# Patient Record
Sex: FEMALE | Race: WHITE | NOT HISPANIC OR LATINO | Employment: FULL TIME | URBAN - NONMETROPOLITAN AREA
[De-identification: names, ages, dates, MRNs, and addresses within clinical notes are randomized per-mention and may not be internally consistent; named-entity substitution may affect disease eponyms.]

---

## 2017-01-16 ENCOUNTER — OFFICE VISIT (OUTPATIENT)
Dept: OBSTETRICS AND GYNECOLOGY | Facility: CLINIC | Age: 42
End: 2017-01-16

## 2017-01-16 VITALS
RESPIRATION RATE: 16 BRPM | DIASTOLIC BLOOD PRESSURE: 64 MMHG | WEIGHT: 145.2 LBS | HEART RATE: 74 BPM | HEIGHT: 62 IN | SYSTOLIC BLOOD PRESSURE: 110 MMHG | BODY MASS INDEX: 26.72 KG/M2

## 2017-01-16 DIAGNOSIS — Z12.72 VAGINAL PAP SMEAR: ICD-10-CM

## 2017-01-16 DIAGNOSIS — N63.0 BREAST LUMP IN UPPER OUTER QUADRANT: ICD-10-CM

## 2017-01-16 DIAGNOSIS — Z12.31 SCREENING MAMMOGRAM, ENCOUNTER FOR: ICD-10-CM

## 2017-01-16 DIAGNOSIS — Z01.419 ENCOUNTER FOR GYNECOLOGICAL EXAMINATION: Primary | ICD-10-CM

## 2017-01-16 PROCEDURE — 99395 PREV VISIT EST AGE 18-39: CPT | Performed by: NURSE PRACTITIONER

## 2017-01-16 NOTE — PROGRESS NOTES
Subjective   Chief Complaint   Patient presents with   • Gynecologic Exam     well woman annual visit     Rosario Pruett is a 41 y.o. year old  presenting to be seen for her annual exam.      She is sexually active.  In the past 12 months there has not been new sexual partners.  Condoms are not typically used.  She would not like to be screened for STD's at today's exam.     She exercises regularly: yes.  She wears her seat belt:yes.  She has concerns about domestic violence: no.  She is taking Vit D and Calcium:no  Last colonoscopy: Never  Last DEXA: Never  Last MM2015  Last PAP: 6/15/2015  Hx of abnormal pap: yes 13+ years ago, had cryofreeze, no abnormal pap since.        LMP: , had failed endometrial ablation requiring hysterectomy with left SO.    OB History      Para Term  AB TAB SAB Ectopic Multiple Living    1 1        1          Additional Complaints:  Breast lump noted in right breast. Has been presents since  but pt feels it is getting larger. She has 2 maternal aunts and maternal grandmother that had breast cancer. Denies pain or tenderness, changes of skin or nipple discharge.    The following portions of the patient's history were reviewed and updated as appropriate:problem list, current medications, allergies, past family history, past medical history, past social history and past surgical history.    Smoking status: Never Smoker                                                              Smokeless status: Never Used                        Review of Systems   Constitutional: Negative.    Respiratory: Negative.    Cardiovascular: Negative.    Gastrointestinal: Negative.  Negative for abdominal pain, constipation and diarrhea.   Endocrine: Negative.    Genitourinary: Negative.  Negative for pelvic pain.   Skin: Negative.    Neurological: Negative for dizziness, syncope, light-headedness and headaches.   Psychiatric/Behavioral: Negative for suicidal ideas. The  "patient is not nervous/anxious.          Objective   Visit Vitals   • /64 (BP Location: Right arm, Patient Position: Sitting, Cuff Size: Adult)   • Pulse 74   • Resp 16   • Ht 62\" (157.5 cm)   • Wt 145 lb 3.2 oz (65.9 kg)   • LMP  (Approximate)  Comment: 2009   • Breastfeeding No   • BMI 26.56 kg/m2       General:  well developed; well nourished  no acute distress   Skin:  No suspicious lesions seen   Cardiac: Heart sounds are normal.  Regular rate and rhythm without murmur, gallop or rub.   Resp:  Normal expansion.  Clear to auscultation.  No rales, rhonchi, or wheezing.   Thyroid: not examined   Breasts:  Examined in supine position  Nipples normal without inversion, lesions or discharge  There are no palpable axillary nodes  There is an ~  3 cm mass is present in the right breast at 11 o'clock.  It is 4 cm from the outer edge of the areola.  It is freely mobile.   Abdomen: soft, non-tender; no masses  no umbilical or inginual hernias are present  no hepato-splenomegaly   Psych: alert,oriented, in NAD with a full range of affect, normal behavior and no psychotic features   Pelvis: Clinical staff was present for exam  External genitalia:  normal appearance of the external genitalia including Bartholin's and St. Benedict's glands.  :  urethral meatus normal; urethral hypermobility is absent.  Vaginal:  normal pink mucosa without prolapse or lesions. discharge present -  white and small amount, no odor, pt denies concerns;  Cervix:  absent.  Uterus:  absent.  Adnexa:  normal right ovary palpated, left ovary absent     Lab Review   CBC, CMP and TSH    Imaging  Mammogram report       Assessment   1. Encounter for gyn exam  2. Vaginal pap smear  3. Encounter for screening mammogram  4. Breast lump in upper outer quadrant     Plan   1. Pap results: I will send card in mail or call if abnormal. RTC annually for well woman exams  2. Mammogram and US prn scheduled for 1/19. I will call and discuss results with the pt and " consider referral to surgeon prn. Pt verbalizes understanding.      This note was electronically signed.    Mercedes Rogers, APRN  January 16, 2017

## 2017-01-19 ENCOUNTER — HOSPITAL ENCOUNTER (OUTPATIENT)
Dept: OTHER | Facility: HOSPITAL | Age: 42
Discharge: HOME | End: 2017-01-19
Attending: NURSE PRACTITIONER | Admitting: NURSE PRACTITIONER

## 2017-01-19 DIAGNOSIS — N63.10 BREAST MASS, RIGHT: Primary | ICD-10-CM

## 2017-02-01 ENCOUNTER — OFFICE VISIT (OUTPATIENT)
Dept: SURGERY | Facility: CLINIC | Age: 42
End: 2017-02-01

## 2017-02-01 VITALS
BODY MASS INDEX: 26.5 KG/M2 | HEIGHT: 62 IN | SYSTOLIC BLOOD PRESSURE: 120 MMHG | DIASTOLIC BLOOD PRESSURE: 80 MMHG | WEIGHT: 144 LBS

## 2017-02-01 DIAGNOSIS — R92.8 ABNORMAL MAMMOGRAM, UNSPECIFIED: Primary | ICD-10-CM

## 2017-02-01 DIAGNOSIS — R92.8 ABNORMAL MAMMOGRAM OF RIGHT BREAST: Primary | ICD-10-CM

## 2017-02-01 PROCEDURE — 99203 OFFICE O/P NEW LOW 30 MIN: CPT | Performed by: SURGERY

## 2017-02-01 NOTE — PROGRESS NOTES
Chief Complaint   Patient presents with   • Mass     right breast mass.     HPI    Abnormal RIGHT mammogram and US. Has been watched for 1.5 years. BiRADS 4 RIGHT side.    [facility] [place]      Patient Name- MAMADOU ARCHULETA      ORDERING- SENIA LINDSAY      ATTENDING-       REFERRING- SENIA LINDSAY      -----------------------  PROCEDURE- DIAG MAMMO W/CAD BILAT, ULTRASOUND BREAST LIMITED      HISTORY- Enlarging palpable right breast mass. Routine screening  mammography of the left breast.       COMPARISON- Screening mammogram July 2, 2015      NOTE- Computer-aided detection was utilized during this exam.   Digital breast tomosynthesis was performed.      FINDINGS-   CC and MLO views are obtained of both breasts. Spot compression views  were obtained of the right breast.  The breasts are heterogeneously dense, which may obscure small masses.  A triangular skin marker was placed in the location of palpable  concern in the upper right breast. There is a focal asymmetry in the  upper right breast, 7 cm from the nipple, seen best on the MLO view  which persists with spot compression. An additional focal asymmetry in  the inner right breast, 9 cm from the nipple, persists with spot  compression.  No suspicious microcalcifications are seen in either breast.      Targeted ultrasound performed of the palpable area of concern in the  right breast, 12-00 axis, 5 cm from the nipple demonstrates an  irregular shaped, solid, hypoechoic mass with multiple lobulations,  overall measuring approximately 1.8 x 2.3 x 2.2 cm. This correlates  with the mammographic findings.      Additional imaging of the 3-00 axis of the right breast, 8 cm from the  nipple demonstrates a simple cyst which correlates to the other  mammographic findings.      CONCLUSION-   Solid, hypoechoic, multilobulated mass in the palpable area of concern  of the right breast, 12-00 axis, 5 cm from the nipple. Recommend  ultrasound-guided  biopsy.      BI-RADS Category 4- Suspicious abnormality. Biopsy should be  considered.      Findings and recommendations were discussed with the patient at the  time of the exam.  Findings and recommendations discussed with SENIA LINDSAY on  2017 1-15 PM CST      Electronically Signed By- Jose Maria Shelton On: 2017 13:14:45    ( I have personally reviewed the breast imaging and concur with the findings of the radiologist- BiRADS 4)    Past Medical History   Diagnosis Date   • Abnormal Pap smear of cervix    • Abnormal vaginal bleeding    • Acute sinusitis    • Acute upper respiratory infection    • Allergic rhinitis    • Anxiety state    • Breast pain    • Cough    • Depressive disorder    • Female pelvic peritoneal adhesions    • Fever    • Fibrocystic breast    • Foot pain, right    • Fracture of nasal bones, initial encounter for closed fracture    • Generalized anxiety disorder    • GERD (gastroesophageal reflux disease)    • H/O screening mammography    • Injury of bladder      Inadvertant perforation, due to surgery,     • Kidney stone    • Lump of right breast    • Malaise and fatigue    • Menometrorrhagia    • Multiple joint pain    • Other vitamin B12 deficiency anemias    • Pain in joint of right wrist    • Patient currently pregnant      G 1, P 1     • Uterine fibroid    • Visit for gynecologic examination    • Yeast infection      Past Surgical History   Procedure Laterality Date   • Breast biopsy  1999     Right breast mass. Fibroadenoma. Right breast biopsy.   • Injection of medication  2013     B12 (4)      • Bladder surgery  2009     Bladder trauma and perforation. Closure of cystotomy.   •  section     • Cystoplasty  2001     Kidney stone. Cystoscopy, retrogrades, basket extraction of stone, double J-stent placement.(2)   • Injection of medication  2015     Depo Medrol (Methylprednisone) 80mg (3)      • Other surgical history  2008      Hysteroscope procedure (1)    With thermal Choice endometrial ablation.    • Tubal abdominal ligation     • Endometrial ablation     • Hysterectomy       has one ovary     No current outpatient prescriptions on file.  Allergies   Allergen Reactions   • Codeine    • Lortab [Hydrocodone-Acetaminophen]      Family History   Problem Relation Age of Onset   • Depression Other    • Anxiety disorder Other    • Breast cancer Maternal Aunt      Social History     Social History   • Marital status:      Spouse name: N/A   • Number of children: N/A   • Years of education: N/A     Occupational History   • Health aide     Social History Main Topics   • Smoking status: Never Smoker   • Smokeless tobacco: No   • Alcohol use Yes      Comment: occasionally   • Drug use: No   • Sexual activity: Yes     Partners: Male     Birth control/ protection: Surgical       Review of Systems   Constitutional: Positive for appetite change. Negative for chills, fever and unexpected weight change.   HENT: Negative for hearing loss, nosebleeds and trouble swallowing.    Eyes: Negative for visual disturbance.   Respiratory: Negative for apnea, cough, choking, chest tightness, shortness of breath, wheezing and stridor.    Cardiovascular: Negative for chest pain, palpitations and leg swelling.   Gastrointestinal: Negative for abdominal distention, abdominal pain, blood in stool, constipation, diarrhea, nausea and vomiting.   Endocrine: Negative for cold intolerance, heat intolerance, polydipsia, polyphagia and polyuria.   Genitourinary: Negative for difficulty urinating, dysuria, frequency, hematuria and urgency.   Musculoskeletal: Negative for arthralgias, back pain, myalgias and neck pain.   Skin: Negative for color change, pallor and rash.   Allergic/Immunologic: Negative for immunocompromised state.   Neurological: Negative for dizziness, seizures, syncope, light-headedness, numbness and headaches.   Hematological: Negative for adenopathy.    Psychiatric/Behavioral: Negative for suicidal ideas. The patient is not nervous/anxious.      Physical Exam   Constitutional: She is well-developed, well-nourished, and in no distress. No distress.   HENT:   Head: Normocephalic and atraumatic.   Eyes: Conjunctivae and EOM are normal. Pupils are equal, round, and reactive to light.   Neck: Normal range of motion. Neck supple. No JVD present. No tracheal deviation present. No thyromegaly present.   Cardiovascular: Normal rate, regular rhythm and normal heart sounds.    Pulmonary/Chest: Effort normal and breath sounds normal. No respiratory distress. She has no wheezes. She has no rales. She exhibits no tenderness. Right breast exhibits mass and tenderness. Right breast exhibits no inverted nipple, no nipple discharge and no skin change. Left breast exhibits no inverted nipple, no mass, no nipple discharge, no skin change and no tenderness. Breasts are symmetrical.       Lymphadenopathy:     She has no cervical adenopathy.     She has no axillary adenopathy.   Skin: Skin is warm and intact. No lesion and no rash noted. She is not diaphoretic.   Psychiatric: Mood, memory, affect and judgment normal.   Nursing note and vitals reviewed.    ASSESSMENT    Rosario was seen today for mass.    Diagnoses and all orders for this visit:    Abnormal mammogram of right breast    Other orders  -     Place sequential compression device- to be placed on patient in Pre-op; Standing  -     Obtain informed consent; Standing        PLAN  1. US RIGHT mammotome bx with clip placement    The procedure is explained to the patient. The patient will be placed supine for the procedure. A small incision will be made under local anesthesia, and a special, large needle will be used to perform the biopsy under ultrasound guidance.   A permanent titanium clip will be placed in the breast to jean pierre the site of biopsy should further surgery be necessary.  The risks of bleeding/bruising, infection, the  possible need for open biopsy or other procedure, scarring, and poor wound healing are explained. There is a low transfusion risk. The risks of chronic pain are, likewise, low.   Alternatives include open biopsy in the operating room under local anesthesia with moderate sedation or general anesthesia or simply watching the lesion to see if there is change. These are not currently suggested.    She understands and agrees to the procedure.    Signed by Kvng Xavier MD

## 2017-02-06 ENCOUNTER — HOSPITAL ENCOUNTER (OUTPATIENT)
Dept: ULTRASOUND IMAGING | Facility: HOSPITAL | Age: 42
Discharge: HOME | End: 2017-02-06
Admitting: SURGERY

## 2017-02-06 VITALS
BODY MASS INDEX: 26.53 KG/M2 | WEIGHT: 144.18 LBS | SYSTOLIC BLOOD PRESSURE: 116 MMHG | HEIGHT: 62 IN | DIASTOLIC BLOOD PRESSURE: 64 MMHG | HEART RATE: 77 BPM | RESPIRATION RATE: 18 BRPM | TEMPERATURE: 97.1 F | OXYGEN SATURATION: 97 %

## 2017-02-06 DIAGNOSIS — R92.8 ABNORMAL MAMMOGRAM, UNSPECIFIED: ICD-10-CM

## 2017-02-06 PROCEDURE — 19083 BX BREAST 1ST LESION US IMAG: CPT | Performed by: SURGERY

## 2017-02-06 PROCEDURE — A4648 IMPLANTABLE TISSUE MARKER: HCPCS

## 2017-02-06 PROCEDURE — 88305 TISSUE EXAM BY PATHOLOGIST: CPT | Performed by: SURGERY

## 2017-02-06 PROCEDURE — 88305 TISSUE EXAM BY PATHOLOGIST: CPT | Performed by: PATHOLOGY

## 2017-02-06 RX ORDER — DIAZEPAM 5 MG/1
5 TABLET ORAL ONCE
Status: DISCONTINUED | OUTPATIENT
Start: 2017-02-06 | End: 2017-02-06

## 2017-02-06 RX ORDER — LIDOCAINE HYDROCHLORIDE 10 MG/ML
20 INJECTION, SOLUTION INFILTRATION; PERINEURAL ONCE
Status: COMPLETED | OUTPATIENT
Start: 2017-02-06 | End: 2017-02-06

## 2017-02-06 RX ORDER — OXYCODONE HYDROCHLORIDE AND ACETAMINOPHEN 5; 325 MG/1; MG/1
1 TABLET ORAL ONCE
Status: COMPLETED | OUTPATIENT
Start: 2017-02-06 | End: 2017-02-06

## 2017-02-06 RX ORDER — DIAZEPAM 5 MG/1
5 TABLET ORAL ONCE
Status: COMPLETED | OUTPATIENT
Start: 2017-02-06 | End: 2017-02-06

## 2017-02-06 RX ADMIN — OXYCODONE HYDROCHLORIDE AND ACETAMINOPHEN 1 TABLET: 5; 325 TABLET ORAL at 07:30

## 2017-02-06 RX ADMIN — DIAZEPAM 5 MG: 5 TABLET ORAL at 07:30

## 2017-02-06 RX ADMIN — LIDOCAINE HYDROCHLORIDE 20 ML: 10 INJECTION, SOLUTION INFILTRATION; PERINEURAL at 08:13

## 2017-02-06 NOTE — PLAN OF CARE
Problem: Patient Care Overview (Adult)  Goal: Discharge Needs Assessment  Outcome: Outcome(s) achieved Date Met:  02/06/17 02/06/17 0831   Discharge Needs Assessment   Concerns To Be Addressed no discharge needs identified

## 2017-02-06 NOTE — PLAN OF CARE
Problem: Patient Care Overview (Adult)  Goal: Plan of Care Review  Outcome: Outcome(s) achieved Date Met:  02/06/17 02/06/17 0832   Coping/Psychosocial Response Interventions   Plan Of Care Reviewed With patient       Goal: Adult Individualization and Mutuality  Outcome: Outcome(s) achieved Date Met:  02/06/17

## 2017-02-07 LAB
LAB AP CASE REPORT: NORMAL
Lab: NORMAL
PATH REPORT.FINAL DX SPEC: NORMAL
PATH REPORT.GROSS SPEC: NORMAL

## 2017-02-13 ENCOUNTER — OFFICE VISIT (OUTPATIENT)
Dept: SURGERY | Facility: CLINIC | Age: 42
End: 2017-02-13

## 2017-02-13 VITALS
WEIGHT: 144 LBS | DIASTOLIC BLOOD PRESSURE: 60 MMHG | SYSTOLIC BLOOD PRESSURE: 110 MMHG | BODY MASS INDEX: 26.5 KG/M2 | HEIGHT: 62 IN

## 2017-02-13 DIAGNOSIS — N63.10 LUMP OF RIGHT BREAST: Primary | ICD-10-CM

## 2017-02-13 PROCEDURE — 99212 OFFICE O/P EST SF 10 MIN: CPT | Performed by: SURGERY

## 2017-02-13 NOTE — PROGRESS NOTES
Chief Complaint   Patient presents with   • Follow-up     Recheck right breast mammotone 2-6-17.     HPI    Final Diagnosis   RIGHT BREAST, NEEDLE CORE BIOPSY:  FIBROEPITHELIAL LESION CONSISTENT WITH FIBROADENOMA.   Electronically signed by Ray Lacy MD on 2/7/2017 at 1125            Physical Exam   Pulmonary/Chest:         ASSESSMENT    Rosario was seen today for follow-up.    Diagnoses and all orders for this visit:    Lump of right breast  -     US Breast Right Complete; Future         PLAN    1. Recheck in 6 weeks for US of the LEFT breast    Kvng Xavier MD

## 2017-04-03 ENCOUNTER — ANESTHESIA EVENT (OUTPATIENT)
Dept: PERIOP | Facility: HOSPITAL | Age: 42
End: 2017-04-03

## 2017-04-03 ENCOUNTER — APPOINTMENT (OUTPATIENT)
Dept: PREADMISSION TESTING | Facility: HOSPITAL | Age: 42
End: 2017-04-03

## 2017-04-03 ENCOUNTER — OFFICE VISIT (OUTPATIENT)
Dept: SURGERY | Facility: CLINIC | Age: 42
End: 2017-04-03

## 2017-04-03 VITALS
DIASTOLIC BLOOD PRESSURE: 70 MMHG | OXYGEN SATURATION: 99 % | HEART RATE: 74 BPM | SYSTOLIC BLOOD PRESSURE: 112 MMHG | BODY MASS INDEX: 27.05 KG/M2 | WEIGHT: 147 LBS | HEIGHT: 62 IN | RESPIRATION RATE: 18 BRPM

## 2017-04-03 VITALS
BODY MASS INDEX: 27.05 KG/M2 | WEIGHT: 147 LBS | SYSTOLIC BLOOD PRESSURE: 108 MMHG | DIASTOLIC BLOOD PRESSURE: 82 MMHG | HEIGHT: 62 IN

## 2017-04-03 DIAGNOSIS — N63.10 LUMP OF RIGHT BREAST: Primary | ICD-10-CM

## 2017-04-03 PROCEDURE — 99213 OFFICE O/P EST LOW 20 MIN: CPT | Performed by: SURGERY

## 2017-04-03 RX ORDER — SODIUM CHLORIDE, SODIUM LACTATE, POTASSIUM CHLORIDE, CALCIUM CHLORIDE 600; 310; 30; 20 MG/100ML; MG/100ML; MG/100ML; MG/100ML
100 INJECTION, SOLUTION INTRAVENOUS CONTINUOUS
Status: CANCELLED | OUTPATIENT
Start: 2017-04-03

## 2017-04-03 RX ORDER — SODIUM CHLORIDE 0.9 % (FLUSH) 0.9 %
1-10 SYRINGE (ML) INJECTION AS NEEDED
Status: CANCELLED | OUTPATIENT
Start: 2017-04-03

## 2017-04-03 NOTE — PROGRESS NOTES
Chief Complaint   Patient presents with   • Follow-up     recheck right breast.        HPI    Mammotome for a RIGHT breast mass 6 weeks ago. Pathology showed a fibroadenoma. Follow up US shows persistence. Desires excision.    [facility] [place]     Patient Name: MAMADOU ARCHULETA     ORDERING: SARATH XAVIER     ATTENDING:     REFERRING: SARATH XAVIER     -----------------------     PROCEDURE: US BREAST UNILATERAL LIMITED     HISTORY: Recent right breast biopsy.     COMPARISON: Previous right breast ultrasound dated 1/19/2017.     NOTE:   Computer-aided detection was utilized during this exam.   Digital breast tomosynthesis was performed.     Pathology report states fibroepithelial lesion consistent with  fibroadenoma.     FINDINGS: Sonographic imaging of the right breast in the 12:00  axis, 6 cm from the nipple demonstrates a heterogeneous,  hypoechoic mass with irregular margins and posterior acoustic  shadowing. A biopsy clip is noted in the central portion of part  of the lesion. The prebiopsy images are not concordant with a  fibroadenoma. Recommend excisional biopsy.           IMPRESSION:  CONCLUSION:   1. Sonographic imaging of the right breast in the 12:00 axis, 6  cm from the nipple demonstrates a heterogeneous, hypoechoic mass  with irregular margins and posterior acoustic shadowing. A biopsy  clip is noted in portion of the lesion. The prebiopsy images are  not concordant with a fibroadenoma. Recommend excisional biopsy.     2. BI-RADS Category 4: Suspicious abnormality. Biopsy should be  considered.     Findings and recommendations discussed with the patient at the  time of the exam, and with Dr. Xavier on 3/28/2017 3:35 PM CDT.     Electronically signed by: Jose Maria Shelton MD 3/28/2017 3:36 PM CDT  Workstation: [id number]     ( I have personally reviewed the breast imaging and concur with the findings of the radiologist- BiRADS 4 side- likely a persistent fibroadenoma)    Past Medical History:    Diagnosis Date   • Abnormal Pap smear of cervix    • Abnormal vaginal bleeding    • Acute sinusitis    • Acute upper respiratory infection    • Allergic rhinitis    • Anxiety state    • Breast pain    • Cough    • Depressive disorder    • Female pelvic peritoneal adhesions    • Fever    • Fibrocystic breast    • Foot pain, right    • Fracture of nasal bones, initial encounter for closed fracture    • Generalized anxiety disorder    • GERD (gastroesophageal reflux disease)    • H/O screening mammography    • Injury of bladder     Inadvertant perforation, due to surgery,     • Kidney stone    • Lump of right breast    • Malaise and fatigue    • Menometrorrhagia    • Multiple joint pain    • Other vitamin B12 deficiency anemias    • Pain in joint of right wrist    • Patient currently pregnant     G 1, P 1     • Uterine fibroid    • Visit for gynecologic examination    • Yeast infection        Past Surgical History:   Procedure Laterality Date   • BLADDER SURGERY  2009    Bladder trauma and perforation. Closure of cystotomy.   • BREAST BIOPSY  1999    Right breast mass. Fibroadenoma. Right breast biopsy.   •  SECTION     • CYSTOPLASTY  2001    Kidney stone. Cystoscopy, retrogrades, basket extraction of stone, double J-stent placement.(2)   • ENDOMETRIAL ABLATION     • HYSTERECTOMY      has one ovary   • INJECTION OF MEDICATION  2013    B12 (4)      • INJECTION OF MEDICATION  2015    Depo Medrol (Methylprednisone) 80mg (3)      • OTHER SURGICAL HISTORY  2008    Hysteroscope procedure (1)    With thermal Choice endometrial ablation.    • TUBAL ABDOMINAL LIGATION         No current outpatient prescriptions on file.    Allergies   Allergen Reactions   • Codeine GI Intolerance   • Lortab [Hydrocodone-Acetaminophen] Itching       Family History   Problem Relation Age of Onset   • Depression Other    • Anxiety disorder Other    • Breast cancer Maternal Aunt        Social  History     Social History   • Marital status:      Spouse name: N/A   • Number of children: N/A   • Years of education: N/A     Occupational History   • Not on file.     Social History Main Topics   • Smoking status: Never Smoker   • Smokeless tobacco: Never Used   • Alcohol use No   • Drug use: No   • Sexual activity: Yes     Partners: Male     Birth control/ protection: Surgical     Other Topics Concern   • Not on file     Social History Narrative       Review of Systems   Constitutional: Negative for appetite change, chills, fever and unexpected weight change.   HENT: Negative for hearing loss, nosebleeds and trouble swallowing.    Eyes: Negative for visual disturbance.   Respiratory: Negative for apnea, cough, choking, chest tightness, shortness of breath, wheezing and stridor.    Cardiovascular: Negative for chest pain, palpitations and leg swelling.   Gastrointestinal: Negative for abdominal distention, abdominal pain, blood in stool, constipation, diarrhea, nausea and vomiting.   Endocrine: Negative for cold intolerance, heat intolerance, polydipsia, polyphagia and polyuria.   Genitourinary: Negative for difficulty urinating, dysuria, frequency, hematuria and urgency.   Musculoskeletal: Negative for arthralgias, back pain, myalgias and neck pain.   Skin: Negative for color change, pallor and rash.   Allergic/Immunologic: Negative for immunocompromised state.   Neurological: Negative for dizziness, seizures, syncope, light-headedness, numbness and headaches.   Hematological: Negative for adenopathy.   Psychiatric/Behavioral: Negative for suicidal ideas. The patient is not nervous/anxious.        Physical Exam   Constitutional: She appears well-developed and well-nourished.   Eyes: EOM are normal. Pupils are equal, round, and reactive to light.   Neck: Normal range of motion. Neck supple. No tracheal deviation present. No thyromegaly present.   Cardiovascular: Normal rate and regular rhythm.     Pulmonary/Chest: Effort normal and breath sounds normal. No respiratory distress.       Lymphadenopathy:     She has no cervical adenopathy.     She has no axillary adenopathy.   Skin: Skin is warm, dry and intact.   Psychiatric: She has a normal mood and affect. Her speech is normal and behavior is normal. Judgment and thought content normal. Cognition and memory are normal.   Vitals reviewed.        ASSESSMENT    Rosario was seen today for follow-up.    Diagnoses and all orders for this visit:    Lump of right breast  -     Case Request; Standing  -     sodium chloride 0.9 % flush 1-10 mL; Infuse 1-10 mL into a venous catheter As Needed for Line Care.  -     lactated ringers infusion; Infuse 100 mL/hr into a venous catheter Continuous.  -     ceFAZolin (ANCEF) 2 g in sodium chloride 0.9 % 100 mL IVPB; Infuse 2 g into a venous catheter 1 (One) Time.  -     Case Request    Other orders  -     Obtain Informed Consent; Standing  -     Follow Anesthesia Guidelines / Standing Orders; Future  -     Insert Peripheral IV; Standing  -     Saline Lock & Maintain IV Access; Standing        PLAN    1. Excision of RIGHT breast mass    The procedure is explained to the patient. The patient will be placed supine for the procedure. An incision will be made under general anesthesia, and ultrasound guidance will also confirm the extent of the mass.   A permanent titanium clip will be placed in the breast to jean pierre the site of biopsy should further surgery be necessary.  The risks of bleeding/bruising, infection, the possible need for other procedure, scarring, and poor wound healing are explained. There is a low transfusion risk. The risks of chronic pain are, likewise, low.       She understands and agrees to the procedure.          This document has been electronically signed by Kvng Xavier MD on April 3, 2017 9:33 AM

## 2017-04-04 ENCOUNTER — HOSPITAL ENCOUNTER (OUTPATIENT)
Facility: HOSPITAL | Age: 42
Setting detail: HOSPITAL OUTPATIENT SURGERY
Discharge: HOME | End: 2017-04-04
Attending: SURGERY | Admitting: SURGERY

## 2017-04-04 ENCOUNTER — ANESTHESIA (OUTPATIENT)
Dept: PERIOP | Facility: HOSPITAL | Age: 42
End: 2017-04-04

## 2017-04-04 VITALS
HEIGHT: 62 IN | DIASTOLIC BLOOD PRESSURE: 80 MMHG | TEMPERATURE: 97.2 F | RESPIRATION RATE: 18 BRPM | SYSTOLIC BLOOD PRESSURE: 121 MMHG | OXYGEN SATURATION: 98 % | WEIGHT: 146.61 LBS | BODY MASS INDEX: 26.98 KG/M2 | HEART RATE: 80 BPM

## 2017-04-04 DIAGNOSIS — N63.10 LUMP OF RIGHT BREAST: ICD-10-CM

## 2017-04-04 PROCEDURE — 25010000002 ONDANSETRON PER 1 MG: Performed by: NURSE ANESTHETIST, CERTIFIED REGISTERED

## 2017-04-04 PROCEDURE — 25010000002 MIDAZOLAM PER 1 MG: Performed by: NURSE ANESTHETIST, CERTIFIED REGISTERED

## 2017-04-04 PROCEDURE — 88305 TISSUE EXAM BY PATHOLOGIST: CPT | Performed by: SURGERY

## 2017-04-04 PROCEDURE — 25010000002 FENTANYL CITRATE (PF) 100 MCG/2ML SOLUTION: Performed by: NURSE ANESTHETIST, CERTIFIED REGISTERED

## 2017-04-04 PROCEDURE — 25010000002 PROPOFOL 10 MG/ML EMULSION: Performed by: NURSE ANESTHETIST, CERTIFIED REGISTERED

## 2017-04-04 PROCEDURE — 88305 TISSUE EXAM BY PATHOLOGIST: CPT | Performed by: PATHOLOGY

## 2017-04-04 PROCEDURE — 19120 REMOVAL OF BREAST LESION: CPT | Performed by: SURGERY

## 2017-04-04 PROCEDURE — 25010000003 CEFAZOLIN PER 500 MG: Performed by: SURGERY

## 2017-04-04 RX ORDER — LIDOCAINE HYDROCHLORIDE 20 MG/ML
INJECTION, SOLUTION INFILTRATION; PERINEURAL AS NEEDED
Status: DISCONTINUED | OUTPATIENT
Start: 2017-04-04 | End: 2017-04-04 | Stop reason: SURG

## 2017-04-04 RX ORDER — SODIUM CHLORIDE 0.9 % (FLUSH) 0.9 %
1-10 SYRINGE (ML) INJECTION AS NEEDED
Status: DISCONTINUED | OUTPATIENT
Start: 2017-04-04 | End: 2017-04-04 | Stop reason: HOSPADM

## 2017-04-04 RX ORDER — MIDAZOLAM HYDROCHLORIDE 1 MG/ML
INJECTION INTRAMUSCULAR; INTRAVENOUS AS NEEDED
Status: DISCONTINUED | OUTPATIENT
Start: 2017-04-04 | End: 2017-04-04 | Stop reason: SURG

## 2017-04-04 RX ORDER — SODIUM CHLORIDE, SODIUM LACTATE, POTASSIUM CHLORIDE, CALCIUM CHLORIDE 600; 310; 30; 20 MG/100ML; MG/100ML; MG/100ML; MG/100ML
100 INJECTION, SOLUTION INTRAVENOUS CONTINUOUS
Status: DISCONTINUED | OUTPATIENT
Start: 2017-04-04 | End: 2017-04-04 | Stop reason: HOSPADM

## 2017-04-04 RX ORDER — FENTANYL CITRATE 50 UG/ML
INJECTION, SOLUTION INTRAMUSCULAR; INTRAVENOUS AS NEEDED
Status: DISCONTINUED | OUTPATIENT
Start: 2017-04-04 | End: 2017-04-04 | Stop reason: SURG

## 2017-04-04 RX ORDER — ACETAMINOPHEN 325 MG/1
650 TABLET ORAL ONCE AS NEEDED
Status: DISCONTINUED | OUTPATIENT
Start: 2017-04-04 | End: 2017-04-04 | Stop reason: HOSPADM

## 2017-04-04 RX ORDER — LABETALOL HYDROCHLORIDE 5 MG/ML
5 INJECTION, SOLUTION INTRAVENOUS
Status: DISCONTINUED | OUTPATIENT
Start: 2017-04-04 | End: 2017-04-04 | Stop reason: HOSPADM

## 2017-04-04 RX ORDER — DIPHENHYDRAMINE HYDROCHLORIDE 50 MG/ML
12.5 INJECTION INTRAMUSCULAR; INTRAVENOUS
Status: DISCONTINUED | OUTPATIENT
Start: 2017-04-04 | End: 2017-04-04 | Stop reason: HOSPADM

## 2017-04-04 RX ORDER — PROPOFOL 10 MG/ML
VIAL (ML) INTRAVENOUS AS NEEDED
Status: DISCONTINUED | OUTPATIENT
Start: 2017-04-04 | End: 2017-04-04 | Stop reason: SURG

## 2017-04-04 RX ORDER — FLUMAZENIL 0.1 MG/ML
0.2 INJECTION INTRAVENOUS AS NEEDED
Status: DISCONTINUED | OUTPATIENT
Start: 2017-04-04 | End: 2017-04-04 | Stop reason: HOSPADM

## 2017-04-04 RX ORDER — ONDANSETRON 2 MG/ML
INJECTION INTRAMUSCULAR; INTRAVENOUS AS NEEDED
Status: DISCONTINUED | OUTPATIENT
Start: 2017-04-04 | End: 2017-04-04 | Stop reason: SURG

## 2017-04-04 RX ORDER — NALOXONE HCL 0.4 MG/ML
0.2 VIAL (ML) INJECTION AS NEEDED
Status: DISCONTINUED | OUTPATIENT
Start: 2017-04-04 | End: 2017-04-04 | Stop reason: HOSPADM

## 2017-04-04 RX ORDER — ONDANSETRON 2 MG/ML
4 INJECTION INTRAMUSCULAR; INTRAVENOUS ONCE AS NEEDED
Status: DISCONTINUED | OUTPATIENT
Start: 2017-04-04 | End: 2017-04-04 | Stop reason: HOSPADM

## 2017-04-04 RX ORDER — BUPIVACAINE HYDROCHLORIDE AND EPINEPHRINE 5; 5 MG/ML; UG/ML
INJECTION, SOLUTION EPIDURAL; INTRACAUDAL; PERINEURAL AS NEEDED
Status: DISCONTINUED | OUTPATIENT
Start: 2017-04-04 | End: 2017-04-04 | Stop reason: HOSPADM

## 2017-04-04 RX ORDER — ACETAMINOPHEN 650 MG/1
650 SUPPOSITORY RECTAL ONCE AS NEEDED
Status: DISCONTINUED | OUTPATIENT
Start: 2017-04-04 | End: 2017-04-04 | Stop reason: HOSPADM

## 2017-04-04 RX ADMIN — FENTANYL CITRATE 50 MCG: 50 INJECTION, SOLUTION INTRAMUSCULAR; INTRAVENOUS at 07:25

## 2017-04-04 RX ADMIN — LIDOCAINE HYDROCHLORIDE 80 MG: 20 INJECTION, SOLUTION INFILTRATION; PERINEURAL at 07:25

## 2017-04-04 RX ADMIN — FENTANYL CITRATE 25 MCG: 50 INJECTION, SOLUTION INTRAMUSCULAR; INTRAVENOUS at 08:00

## 2017-04-04 RX ADMIN — CEFAZOLIN SODIUM 2 G: 1 INJECTION, POWDER, FOR SOLUTION INTRAMUSCULAR; INTRAVENOUS at 07:27

## 2017-04-04 RX ADMIN — MIDAZOLAM 2 MG: 1 INJECTION INTRAMUSCULAR; INTRAVENOUS at 07:16

## 2017-04-04 RX ADMIN — ONDANSETRON 4 MG: 2 INJECTION INTRAMUSCULAR; INTRAVENOUS at 07:45

## 2017-04-04 RX ADMIN — SODIUM CHLORIDE, POTASSIUM CHLORIDE, SODIUM LACTATE AND CALCIUM CHLORIDE 100 ML/HR: 600; 310; 30; 20 INJECTION, SOLUTION INTRAVENOUS at 06:10

## 2017-04-04 RX ADMIN — PROPOFOL 200 MG: 10 INJECTION, EMULSION INTRAVENOUS at 07:25

## 2017-04-04 RX ADMIN — FENTANYL CITRATE 25 MCG: 50 INJECTION, SOLUTION INTRAMUSCULAR; INTRAVENOUS at 08:20

## 2017-04-04 NOTE — PLAN OF CARE
Problem: Patient Care Overview (Adult)  Goal: Plan of Care Review  Outcome: Ongoing (interventions implemented as appropriate)    04/04/17 0842   Coping/Psychosocial Response Interventions   Plan Of Care Reviewed With patient   Patient Care Overview   Progress improving   Outcome Evaluation   Outcome Summary/Follow up Plan vss, pain management begun, no distress noted, dsg noted cdi       Goal: Adult Individualization and Mutuality  Outcome: Ongoing (interventions implemented as appropriate)    Problem: Perioperative Period (Adult)  Goal: Signs and Symptoms of Listed Potential Problems Will be Absent or Manageable (Perioperative Period)  Outcome: Ongoing (interventions implemented as appropriate)

## 2017-04-04 NOTE — ANESTHESIA PREPROCEDURE EVALUATION
Anesthesia Evaluation     Patient summary reviewed and Nursing notes reviewed   NPO Status: > 8 hours   Airway   Mallampati: II  TM distance: >3 FB  Neck ROM: full  no difficulty expected  Dental - normal exam     Pulmonary - negative pulmonary ROS and normal exam   Cardiovascular - negative cardio ROS and normal exam        Neuro/Psych- negative ROS  GI/Hepatic/Renal/Endo    (+)  GERD,     Musculoskeletal (-) negative ROS    Abdominal  - normal exam   Substance History - negative use     OB/GYN negative ob/gyn ROS         Other                                    Anesthesia Plan    ASA 2     general     intravenous induction   Anesthetic plan and risks discussed with patient.    Plan discussed with CRNA.

## 2017-04-04 NOTE — OP NOTE
BREAST BIOPSY  Procedure Note    Rosario Pruett  4/4/2017    Pre-op Diagnosis:   Lump of right breast [N63]    Post-op Diagnosis:     Post-Op Diagnosis Codes:     * Lump of right breast [N63]    Procedure/CPT® Codes: 67689    Procedure(s):  EXCISION RIGHT BREAST MASS AND INTRAOPERATIVE US    Surgeon(s):  Kvng Xavier MD    Anesthesia: General    Staff:   Circulator: Cony Contreras RN; Daniel Sorenson RN  Scrub Person: Ezequiel Melendez  Assistant: Dyan Dockery CSA    Estimated Blood Loss: 10 cc    Specimens:                  ID Type Source Tests Collected by Time Destination   A : Right Breast fiberadenoma. 4cm from nipple 12 o'clock position. Tissue Breast, Right TISSUE EXAM Kvng Xavier MD 4/4/2017 0748    B : additional right breast tissue Tissue Breast, Right TISSUE EXAM Kvng Xavier MD 4/4/2017 0809          Drains:    None       Findings: Fibroadenoma excised    Complications: None    Description of procedure: The patient is brought to the operating room and placed supine on the operating table.  After adequate general anesthesia, right breast was prepped and draped in a sterile manner.  Ultrasound was used to localize the position of the fibroadenoma as it was located at the 12 o'clock position, 4 cm from the nipple, but encased in dense fibrous breast tissue.  An arcuate incision was made from the 10:00 to the 2 o'clock position on the superior aspect of the skin-areolar margin.  It was carried in the subcutaneous tissue.  The mass was completely excised using electrocautery.  It was confirmed that the mass had been excised by reviewed with the pathologist. A metalic clip in a small piece of Gelfoam placed into the cavity.  The wound was closed progressively with interrupted 3-0 Vicryl's in the subcutaneous tissue, interrupted 3-0 Vicryl's in the subdermal area, and continuous 4-0 subcuticular Vicryl on skin.  Procedure was terminated.  She tolerated it well.  Sponge and needle counts were  correct.  She was returned to recovery in satisfactory condition.    Kvng Xavier MD     Date: 4/4/2017  Time: 8:27 AM

## 2017-04-04 NOTE — PLAN OF CARE
Problem: Perioperative Period (Adult)  Goal: Signs and Symptoms of Listed Potential Problems Will be Absent or Manageable (Perioperative Period)  Outcome: Outcome(s) achieved Date Met:  04/04/17

## 2017-04-04 NOTE — ANESTHESIA POSTPROCEDURE EVALUATION
Patient: Rosario Pruett    Procedure Summary     Date Anesthesia Start Anesthesia Stop Room / Location    04/04/17 0718   [facility] / [facility]       Procedure Diagnosis Surgeon Provider    EXCISION RIGHT BREAST MASS (Right Breast) Lump of right breast  (Lump of right breast [N63]) MD Lisa Skaggs CRNA          Anesthesia Type: general  Last vitals  BP      Temp      Pulse     Resp      SpO2        Post Anesthesia Care and Evaluation    Patient location during evaluation: bedside  Patient participation: complete - patient participated  Level of consciousness: responsive to verbal stimuli  Pain management: adequate  Airway patency: patent  Anesthetic complications: No anesthetic complications    Cardiovascular status: acceptable  Respiratory status: acceptable  Hydration status: acceptable

## 2017-04-04 NOTE — ANESTHESIA PROCEDURE NOTES
Airway  Airway not difficult    General Information and Staff    Patient location during procedure: OR  CRNA: WILFRED VALLE    Indications and Patient Condition  Indications for airway management: airway protection    Preoxygenated: yes  MILS maintained throughout  Mask difficulty assessment: 0 - not attempted    Final Airway Details  Final airway type: supraglottic airway      Successful airway: classic  Size 3    Number of attempts at approach: 1

## 2017-04-04 NOTE — PLAN OF CARE
Problem: Patient Care Overview (Adult)  Goal: Plan of Care Review  Outcome: Ongoing (interventions implemented as appropriate)  Dc criteria met.

## 2017-04-06 LAB
LAB AP CASE REPORT: NORMAL
LAB AP CLINICAL INFORMATION: NORMAL
LAB AP DIAGNOSIS COMMENT: NORMAL
Lab: NORMAL
Lab: NORMAL
PATH REPORT.FINAL DX SPEC: NORMAL
PATH REPORT.GROSS SPEC: NORMAL

## 2017-04-12 ENCOUNTER — OFFICE VISIT (OUTPATIENT)
Dept: SURGERY | Facility: CLINIC | Age: 42
End: 2017-04-12

## 2017-04-12 VITALS
HEIGHT: 62 IN | BODY MASS INDEX: 27.6 KG/M2 | WEIGHT: 150 LBS | DIASTOLIC BLOOD PRESSURE: 82 MMHG | SYSTOLIC BLOOD PRESSURE: 114 MMHG

## 2017-04-12 DIAGNOSIS — D24.1 FIBROADENOMA OF BREAST, RIGHT: Primary | ICD-10-CM

## 2017-04-12 PROCEDURE — 99024 POSTOP FOLLOW-UP VISIT: CPT | Performed by: SURGERY

## 2017-04-12 NOTE — PROGRESS NOTES
"Chief Complaint   Patient presents with   • Follow-up     post operative right breast biopsy 17.        HPI    Final Diagnosis   1. MASS, RIGHT BREAST, EXCISIONAL BIOPSY:   FIBROEPITHELIAL TUMOR CONSISTENT WITH \"FIBROADENOMA WITH PHYLLODES FEATURES\".      2. ADDITIONAL TISSUES FROM RIGHT BREAST:   NO SIGNIFICANT PATHOLOGIC DIAGNOSIS.    Electronically signed by Ray Lacy MD on 2017 at 1332         Past Medical History:   Diagnosis Date   • Abnormal Pap smear of cervix    • Abnormal vaginal bleeding    • Acute sinusitis    • Acute upper respiratory infection    • Allergic rhinitis    • Anxiety state    • Breast pain    • Cough    • Depressive disorder    • Female pelvic peritoneal adhesions    • Fever    • Fibrocystic breast    • Foot pain, right    • Fracture of nasal bones, initial encounter for closed fracture    • Generalized anxiety disorder    • GERD (gastroesophageal reflux disease)    • H/O screening mammography    • Injury of bladder     Inadvertant perforation, due to surgery,     • Kidney stone    • Lump of right breast    • Malaise and fatigue    • Menometrorrhagia    • Multiple joint pain    • Other vitamin B12 deficiency anemias    • Pain in joint of right wrist    • Patient currently pregnant     G 1, P 1     • Uterine fibroid    • Visit for gynecologic examination    • Yeast infection        Past Surgical History:   Procedure Laterality Date   • BLADDER SURGERY  2009    Bladder trauma and perforation. Closure of cystotomy.   • BREAST BIOPSY  1999    Right breast mass. Fibroadenoma. Right breast biopsy.   • BREAST BIOPSY Right 2017    Procedure: EXCISION RIGHT BREAST MASS;  Surgeon: Kvng Xavier MD;  Location: [facility];  Service:    •  SECTION     • CYSTOPLASTY  2001    Kidney stone. Cystoscopy, retrogrades, basket extraction of stone, double J-stent placement.(2)   • ENDOMETRIAL ABLATION     • HYSTERECTOMY      has one ovary   • INJECTION OF " MEDICATION  12/26/2013    B12 (4)      • INJECTION OF MEDICATION  06/01/2015    Depo Medrol (Methylprednisone) 80mg (3)      • OTHER SURGICAL HISTORY  05/14/2008    Hysteroscope procedure (1)    With thermal Choice endometrial ablation.    • TUBAL ABDOMINAL LIGATION         No current outpatient prescriptions on file.    Allergies   Allergen Reactions   • Codeine GI Intolerance   • Lortab [Hydrocodone-Acetaminophen] Itching       Family History   Problem Relation Age of Onset   • Depression Other    • Anxiety disorder Other    • Breast cancer Maternal Aunt        Social History     Social History   • Marital status:      Spouse name: N/A   • Number of children: N/A   • Years of education: N/A     Occupational History   • Not on file.     Social History Main Topics   • Smoking status: Never Smoker   • Smokeless tobacco: Never Used   • Alcohol use No   • Drug use: No   • Sexual activity: Yes     Birth control/ protection: Surgical     Other Topics Concern   • Not on file     Social History Narrative       Review of Systems    Done    Physical Exam   Pulmonary/Chest:             ASSESSMENT    Rosario was seen today for follow-up.    Diagnoses and all orders for this visit:    Fibroadenoma of breast, right      PLAN    1. Recheck in 4 months for in office US and examination          This document has been electronically signed by Kvng Xavier MD on April 12, 2017 9:08 AM

## 2017-06-09 ENCOUNTER — OFFICE VISIT (OUTPATIENT)
Dept: FAMILY MEDICINE CLINIC | Facility: CLINIC | Age: 42
End: 2017-06-09

## 2017-06-09 VITALS
BODY MASS INDEX: 27.2 KG/M2 | SYSTOLIC BLOOD PRESSURE: 112 MMHG | HEART RATE: 85 BPM | DIASTOLIC BLOOD PRESSURE: 70 MMHG | HEIGHT: 62 IN | TEMPERATURE: 97.7 F | WEIGHT: 147.8 LBS

## 2017-06-09 DIAGNOSIS — J01.00 ACUTE NON-RECURRENT MAXILLARY SINUSITIS: ICD-10-CM

## 2017-06-09 DIAGNOSIS — J01.10 ACUTE NON-RECURRENT FRONTAL SINUSITIS: ICD-10-CM

## 2017-06-09 DIAGNOSIS — M54.42 ACUTE BILATERAL LOW BACK PAIN WITH LEFT-SIDED SCIATICA: Primary | ICD-10-CM

## 2017-06-09 PROCEDURE — 96372 THER/PROPH/DIAG INJ SC/IM: CPT | Performed by: NURSE PRACTITIONER

## 2017-06-09 PROCEDURE — 99214 OFFICE O/P EST MOD 30 MIN: CPT | Performed by: NURSE PRACTITIONER

## 2017-06-09 RX ORDER — KETOROLAC TROMETHAMINE 30 MG/ML
30 INJECTION, SOLUTION INTRAMUSCULAR; INTRAVENOUS ONCE
Status: COMPLETED | OUTPATIENT
Start: 2017-06-09 | End: 2017-06-09

## 2017-06-09 RX ORDER — IBUPROFEN 800 MG/1
800 TABLET ORAL EVERY 8 HOURS PRN
Qty: 60 TABLET | Refills: 0 | Status: SHIPPED | OUTPATIENT
Start: 2017-06-09 | End: 2018-08-21

## 2017-06-09 RX ORDER — METHYLPREDNISOLONE ACETATE 80 MG/ML
80 INJECTION, SUSPENSION INTRA-ARTICULAR; INTRALESIONAL; INTRAMUSCULAR; SOFT TISSUE ONCE
Status: COMPLETED | OUTPATIENT
Start: 2017-06-09 | End: 2017-06-09

## 2017-06-09 RX ORDER — CEFDINIR 300 MG/1
300 CAPSULE ORAL 2 TIMES DAILY
Qty: 20 CAPSULE | Refills: 0 | Status: SHIPPED | OUTPATIENT
Start: 2017-06-09 | End: 2017-06-19

## 2017-06-09 RX ORDER — METAXALONE 800 MG/1
800 TABLET ORAL 3 TIMES DAILY PRN
Qty: 30 TABLET | Refills: 0 | Status: SHIPPED | OUTPATIENT
Start: 2017-06-09 | End: 2018-06-06

## 2017-06-09 RX ADMIN — KETOROLAC TROMETHAMINE 30 MG: 30 INJECTION, SOLUTION INTRAMUSCULAR; INTRAVENOUS at 09:54

## 2017-06-09 RX ADMIN — METHYLPREDNISOLONE ACETATE 80 MG: 80 INJECTION, SUSPENSION INTRA-ARTICULAR; INTRALESIONAL; INTRAMUSCULAR; SOFT TISSUE at 09:54

## 2017-06-09 NOTE — PROGRESS NOTES
Subjective   Rosario Pruett is a 42 y.o. female. Patient here today with complaints of Muscle Pain (back pain)  pt here today with complaints of lower back pain x 1 week, worsening, pain is constant, worse with standing, sitting, position changes, lifting heavy objects. Rates 6/10 on pain scale today. Has tried ultram, ibuprofen, cannot take norco. Cannot raise legs without pain, cannot sleep due to pain. Pain worse with walking up steps, denies injury except for household chores, laundry , etc. Denies changes in bowel/bladder. Also complaining of yellowish sinus drg, headache, cough and sneezing x 2 weeks. Denies fever.     Vitals:    06/09/17 0926   BP: 112/70   Pulse: 85   Temp: 97.7 °F (36.5 °C)     Past Medical History:   Diagnosis Date   • Abnormal Pap smear of cervix    • Abnormal vaginal bleeding    • Acute sinusitis    • Acute upper respiratory infection    • Allergic rhinitis    • Anxiety state    • Breast pain    • Cough    • Depressive disorder    • Female pelvic peritoneal adhesions    • Fever    • Fibrocystic breast    • Foot pain, right    • Fracture of nasal bones, initial encounter for closed fracture    • Generalized anxiety disorder    • GERD (gastroesophageal reflux disease)    • H/O screening mammography    • Injury of bladder     Inadvertant perforation, due to surgery, 2009    • Kidney stone    • Lump of right breast    • Malaise and fatigue    • Menometrorrhagia    • Multiple joint pain    • Other vitamin B12 deficiency anemias    • Pain in joint of right wrist    • Patient currently pregnant     G 1, P 1     • Uterine fibroid    • Visit for gynecologic examination    • Yeast infection      Back Pain   This is a new problem. The current episode started in the past 7 days. The problem occurs constantly. The problem has been gradually worsening since onset. The pain is present in the lumbar spine. The quality of the pain is described as aching. The pain does not radiate. The pain is at  a severity of 6/10. The pain is moderate. The pain is the same all the time. The symptoms are aggravated by twisting, standing, sitting, position, coughing and bending. Associated symptoms include headaches. Pertinent negatives include no abdominal pain, bladder incontinence, bowel incontinence, chest pain, dysuria, fever, leg pain, numbness, paresis, paresthesias, pelvic pain, perianal numbness, tingling, weakness or weight loss. She has tried NSAIDs, analgesics and bed rest for the symptoms. The treatment provided no relief.   Sinusitis   This is a new problem. The current episode started 1 to 4 weeks ago. The problem is unchanged. There has been no fever. The pain is mild. Associated symptoms include congestion, coughing, headaches, sinus pressure, sneezing and a sore throat. Pertinent negatives include no chills, diaphoresis, ear pain, hoarse voice, neck pain, shortness of breath or swollen glands. Past treatments include nothing. The treatment provided no relief.        The following portions of the patient's history were reviewed and updated as appropriate: allergies, current medications, past family history, past medical history, past social history, past surgical history and problem list.    Review of Systems   Constitutional: Negative.  Negative for chills, diaphoresis, fever and weight loss.   HENT: Positive for congestion, sinus pressure, sneezing and sore throat. Negative for ear pain and hoarse voice.    Eyes: Negative.    Respiratory: Positive for cough. Negative for shortness of breath.    Cardiovascular: Negative.  Negative for chest pain.   Gastrointestinal: Negative.  Negative for abdominal pain and bowel incontinence.   Endocrine: Negative.    Genitourinary: Negative.  Negative for bladder incontinence, dysuria and pelvic pain.   Musculoskeletal: Positive for back pain. Negative for neck pain.   Skin: Negative.    Allergic/Immunologic: Negative.    Neurological: Positive for headaches. Negative  for tingling, weakness, numbness and paresthesias.   Hematological: Negative.    Psychiatric/Behavioral: Negative.        Objective   Physical Exam   Constitutional: She is oriented to person, place, and time. She appears well-developed and well-nourished. No distress.   HENT:   Head: Normocephalic and atraumatic.   Right Ear: Hearing, external ear and ear canal normal. No drainage, swelling or tenderness. A middle ear effusion is present. No decreased hearing is noted.   Left Ear: Hearing, external ear and ear canal normal. No drainage, swelling or tenderness. A middle ear effusion is present. No decreased hearing is noted.   Nose: Right sinus exhibits maxillary sinus tenderness and frontal sinus tenderness. Left sinus exhibits maxillary sinus tenderness and frontal sinus tenderness.   Mouth/Throat: Uvula is midline and mucous membranes are normal. Posterior oropharyngeal erythema present. No tonsillar exudate.   Neck: Neck supple.   Cardiovascular: Normal rate, regular rhythm and normal heart sounds.  Exam reveals no gallop and no friction rub.    No murmur heard.  Pulmonary/Chest: Effort normal and breath sounds normal. No respiratory distress. She has no wheezes. She has no rales.   Musculoskeletal: She exhibits tenderness.        Lumbar back: She exhibits decreased range of motion, tenderness, bony tenderness and pain. She exhibits no swelling, no edema, no deformity, no laceration, no spasm and normal pulse.   Pos SLR, pain to palp of mid lower back, lumbar area, gait without assist but guarded   Lymphadenopathy:     She has no cervical adenopathy.   Neurological: She is alert and oriented to person, place, and time. She has normal reflexes. Coordination abnormal.   Skin: Skin is warm and dry. No rash noted. She is not diaphoretic. No erythema. No pallor.   Psychiatric: She has a normal mood and affect. Her behavior is normal. Judgment and thought content normal.   Nursing note and vitals  reviewed.      Assessment/Plan   Rosario was seen today for muscle pain.    Diagnoses and all orders for this visit:    Acute bilateral low back pain with left-sided sciatica  -     XR Spine Lumbar 4+ View  -     methylPREDNISolone acetate (DEPO-medrol) injection 80 mg; Inject 1 mL into the shoulder, thigh, or buttocks 1 (One) Time.  -     ketorolac (TORADOL) injection 30 mg; Inject 30 mg into the shoulder, thigh, or buttocks 1 (One) Time.    Acute non-recurrent frontal sinusitis    Acute non-recurrent maxillary sinusitis    Other orders  -     metaxalone (SKELAXIN) 800 MG tablet; Take 1 tablet by mouth 3 (Three) Times a Day As Needed for Muscle Spasms.  -     ibuprofen (ADVIL,MOTRIN) 800 MG tablet; Take 1 tablet by mouth Every 8 (Eight) Hours As Needed for Mild Pain (1-3) or Moderate Pain (4-6).  -     cefdinir (OMNICEF) 300 MG capsule; Take 1 capsule by mouth 2 (Two) Times a Day for 10 days.        She is advised good body mechanics, no heavy lifting, no lifting > 5-10 pounds, advised good body mechanics. She is given omnicef , ibuprofen and skelaxin as above, also given depo 80mg inj and toradol inj IM in office today. Will be advised to use ice / heat to area as well. Will obtain L/S xr and if her symptoms persist or worsen she is to RTC for recheck. Otherwise, will see her back in follow up as scheduled. She is aware and in agreement to this plan. All questions and concerns are addressed with understanding noted. The patient is in agreement to above plan.

## 2017-08-31 ENCOUNTER — OFFICE VISIT (OUTPATIENT)
Dept: FAMILY MEDICINE CLINIC | Facility: CLINIC | Age: 42
End: 2017-08-31

## 2017-08-31 VITALS
HEART RATE: 70 BPM | BODY MASS INDEX: 27.75 KG/M2 | HEIGHT: 62 IN | WEIGHT: 150.8 LBS | DIASTOLIC BLOOD PRESSURE: 88 MMHG | SYSTOLIC BLOOD PRESSURE: 134 MMHG | OXYGEN SATURATION: 98 %

## 2017-08-31 DIAGNOSIS — M54.40 LOW BACK PAIN WITH SCIATICA, SCIATICA LATERALITY UNSPECIFIED, UNSPECIFIED BACK PAIN LATERALITY, UNSPECIFIED CHRONICITY: Primary | ICD-10-CM

## 2017-08-31 DIAGNOSIS — M54.9 BACK PAIN, UNSPECIFIED BACK LOCATION, UNSPECIFIED BACK PAIN LATERALITY, UNSPECIFIED CHRONICITY: Primary | ICD-10-CM

## 2017-08-31 PROCEDURE — 96372 THER/PROPH/DIAG INJ SC/IM: CPT | Performed by: NURSE PRACTITIONER

## 2017-08-31 PROCEDURE — 99214 OFFICE O/P EST MOD 30 MIN: CPT | Performed by: NURSE PRACTITIONER

## 2017-08-31 RX ORDER — TRAMADOL HYDROCHLORIDE 50 MG/1
50 TABLET ORAL EVERY 6 HOURS PRN
Qty: 60 TABLET | Refills: 0 | Status: SHIPPED | OUTPATIENT
Start: 2017-08-31 | End: 2018-06-06

## 2017-08-31 RX ORDER — TRIAMCINOLONE ACETONIDE 40 MG/ML
40 INJECTION, SUSPENSION INTRA-ARTICULAR; INTRAMUSCULAR ONCE
Status: COMPLETED | OUTPATIENT
Start: 2017-08-31 | End: 2017-08-31

## 2017-08-31 RX ADMIN — TRIAMCINOLONE ACETONIDE 40 MG: 40 INJECTION, SUSPENSION INTRA-ARTICULAR; INTRAMUSCULAR at 15:26

## 2017-09-13 ENCOUNTER — HOSPITAL ENCOUNTER (OUTPATIENT)
Dept: MRI IMAGING | Facility: HOSPITAL | Age: 42
Discharge: HOME | End: 2017-09-13
Admitting: NURSE PRACTITIONER

## 2017-09-13 DIAGNOSIS — M54.40 LOW BACK PAIN WITH SCIATICA, SCIATICA LATERALITY UNSPECIFIED, UNSPECIFIED BACK PAIN LATERALITY, UNSPECIFIED CHRONICITY: ICD-10-CM

## 2017-09-13 PROCEDURE — 72148 MRI LUMBAR SPINE W/O DYE: CPT

## 2017-09-26 ENCOUNTER — TRANSCRIBE ORDERS (OUTPATIENT)
Dept: PHYSICAL THERAPY | Facility: CLINIC | Age: 42
End: 2017-09-26

## 2017-09-26 DIAGNOSIS — M54.5 LOW BACK PAIN, UNSPECIFIED BACK PAIN LATERALITY, UNSPECIFIED CHRONICITY, WITH SCIATICA PRESENCE UNSPECIFIED: Primary | ICD-10-CM

## 2017-09-27 ENCOUNTER — CONSULT (OUTPATIENT)
Dept: PHYSICAL THERAPY | Facility: CLINIC | Age: 42
End: 2017-09-27

## 2017-09-27 DIAGNOSIS — M54.40 ACUTE BACK PAIN WITH SCIATICA, UNSPECIFIED LATERALITY: Primary | ICD-10-CM

## 2017-09-27 PROCEDURE — 97162 PT EVAL MOD COMPLEX 30 MIN: CPT | Performed by: PHYSICAL THERAPIST

## 2017-09-27 PROCEDURE — 97110 THERAPEUTIC EXERCISES: CPT | Performed by: PHYSICAL THERAPIST

## 2017-09-27 NOTE — PROGRESS NOTES
Outpatient Physical Therapy Ortho Initial Evaluation       Patient Name: Rosario Pruett  : 1975  MRN: [id number]  Today's Date: 2017      Visit Date: 2017  Visit   Return to MD: ANDREW  Re-cert date: 10/18/17  TIME IN 1446    TIME OUT 1535     Patient Active Problem List   Diagnosis   • Lump of right breast        Past Medical History:   Diagnosis Date   • Abnormal Pap smear of cervix    • Abnormal vaginal bleeding    • Acute sinusitis    • Acute upper respiratory infection    • Allergic rhinitis    • Anxiety state    • Breast pain    • Cough    • Depressive disorder    • Female pelvic peritoneal adhesions    • Fever    • Fibrocystic breast    • Foot pain, right    • Fracture of nasal bones, initial encounter for closed fracture    • Generalized anxiety disorder    • GERD (gastroesophageal reflux disease)    • H/O screening mammography    • Injury of bladder     Inadvertant perforation, due to surgery,     • Kidney stone    • Lump of right breast    • Malaise and fatigue    • Menometrorrhagia    • Multiple joint pain    • Other vitamin B12 deficiency anemias    • Pain in joint of right wrist    • Patient currently pregnant     G 1, P 1     • Uterine fibroid    • Visit for gynecologic examination    • Yeast infection         Past Surgical History:   Procedure Laterality Date   • BLADDER SURGERY  2009    Bladder trauma and perforation. Closure of cystotomy.   • BREAST BIOPSY  1999    Right breast mass. Fibroadenoma. Right breast biopsy.   • BREAST BIOPSY Right 2017    Procedure: EXCISION RIGHT BREAST MASS;  Surgeon: Kvng Xavier MD;  Location: [facility];  Service:    •  SECTION     • CYSTOPLASTY  2001    Kidney stone. Cystoscopy, retrogrades, basket extraction of stone, double J-stent placement.(2)   • ENDOMETRIAL ABLATION     • HYSTERECTOMY      has one ovary   • INJECTION OF MEDICATION  2013    B12 (4)      • INJECTION OF MEDICATION  2015     Depo Medrol (Methylprednisone) 80mg (3)      • OTHER SURGICAL HISTORY  2008    Hysteroscope procedure (1)    With thermal Choice endometrial ablation.    • TUBAL ABDOMINAL LIGATION         Visit Dx:     ICD-10-CM ICD-9-CM   1. Acute back pain with sciatica, unspecified laterality M54.40 724.3       Subjective Evaluation    History of Present Illness  Mechanism of injury: 43 yo female with acute onset low back pain in May 2017, insidious onset, went to her PCP the next month. Prescribed anti-inflammatories, muscle relaxers and an x-ray of the lumbar spine at the time. Patient c/o right buttocks pain and central lumbar and R sided lower lumbar region pain. No c/o LE radiculopathy. Slight improvement since onset.     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))  Lives with: spouse and young children    Diagnostic Tests  MRI studies: abnormal (L4-5 and L5-S1 mild disc bulges and L4-5 facet arthropathy)    Treatments  Current treatment: medication and physical therapy  Patient Goals  Patient goals for therapy: decreased pain, increased motion and return to sport/leisure activities                      Strength RLE  R Hip Flexion: 4-/5  R Knee Flexion: 5/5  R Knee Extension: 4+/5  R Ankle Dorsiflexion: 4/5  R Ankle Plantar Flexion: 5/5  1st Toe Extension: 4/5              Strength LLE  L Hip Flexion: 4+/5  L Knee Flexion: 5/5  L Knee Extension: 5/5  L Ankle Dorsiflexion: 5/5  L Ankle Plantar Flexion: 5/5  1st Toe Extension: 5/5                           Body Part  Body Part: Lumbar                       Lumbar Spine Range of Motion  Lumbar Spine Flexion:  (mod limit 50%)  Lumbar Spine Extension:  (25% severe limit)  Lumbar Spine Sidebend Left:  (75% B min limit)  Lumbar Spine Rotation Left:  (75% min limit B)     Lumbar Special Tests  SLR (Neural Tension): Right:, Positive, Left:, Negative  JOVANNI (hip vs. SI Dysfunction): Bilateral:, Positive  Other:  (TTP to PAIVM's to L5 spinous process)                           Flexibility  Flexibility Tested?: Lower Extremity     Lower Extremity Flexibility  Hamstrings: Bilateral:, Severely limited                              Exercises       09/27/17 1545          Exercise 1    Exercise Name 1 prone lying  -BS      Exercise 2    Exercise Name 2 seated piriformis stretch  -BS      Exercise 3    Exercise Name 3 pt ed sitting posture  -BS        User Key  (r) = Recorded By, (t) = Taken By, (c) = Cosigned By    Initials Name Provider Type    BS Poli Wills, PT Physical Therapist             Therapeutic ex: 10 min               Assessment & Plan     Assessment  Impairments: abnormal or restricted ROM, activity intolerance, impaired physical strength, lacks appropriate home exercise program and pain with function  Assessment details: Acute central and R sided LBP with evidence of R LE radiculopathy. Patient presents with limited lumbar spine AROM (worst with extension), severe LBP, altered neural tissue tension with R LE and poor sitting posture.  Prognosis: good  Functional Limitations: sleeping, walking, sitting and standing  Goals  Plan Goals: STG's (2 weeks)  1. Pt I with HEP.  2. Improve lumbar spine extension AROM to minimal limit,75%.  2. Improve B hip flex and R ankle DF MMT to 5/5.  3. Improve B hip ext MMT to 4+/5 level.  4. Reduce LBP/R sided buttocks pain to 3-4/10 with climbing stairs and performing ADL's.  5. Improve sitting tolerance to 45 minutes.    LTG's (4 weeks)  1. Improve lumbar spine extension AROM to  WNL.  2. Improve B hip ext MMT to 5/5.  3. Reduce LBP/R sided buttocks pain to 1-2/10 with climbing stairs and performing ADL's.  4. Improve sitting tolerance to 60 minutes.    Plan  Therapy options: will be seen for skilled physical therapy services  Planned modality interventions: cryotherapy, electrical stimulation/Russian stimulation, thermotherapy (hydrocollator packs), ultrasound and traction  Planned therapy interventions: abdominal trunk stabilization, manual therapy, postural training, soft tissue mobilization, spinal/joint mobilization, strengthening, stretching, joint mobilization, home exercise program, functional ROM exercises and flexibility  Frequency: 3x week  Duration in weeks: 4  Treatment plan discussed with: patient  Plan details: F/u with slow prone progression to promote centralization of R sided buttocks symptoms, review sitting posture at follow up session.        Time Calculation: 49      Modified Oswestry score 34%                Poli Wills, PT  9/27/2017        Rosario Pruett    1975

## 2017-10-02 ENCOUNTER — TREATMENT (OUTPATIENT)
Dept: PHYSICAL THERAPY | Facility: CLINIC | Age: 42
End: 2017-10-02

## 2017-10-02 DIAGNOSIS — M54.40 ACUTE BACK PAIN WITH SCIATICA, UNSPECIFIED LATERALITY: Primary | ICD-10-CM

## 2017-10-02 PROCEDURE — 97110 THERAPEUTIC EXERCISES: CPT | Performed by: PHYSICAL THERAPIST

## 2017-10-02 NOTE — PROGRESS NOTES
"Daily Treatment Note    Time In: 1445      Time Out: 1530    ICD-10-CM ICD-9-CM   1. Acute back pain with sciatica, unspecified laterality M54.40 724.3       Subjective   Pt reports doing better. Pain localized to LB only today. She is doing HEP with some difficulty with piriformis stretch.   Patient reports to therapy 2/10 pain, and % improvement.  Attendance  2/2 visits. Recert 10/18. MD f/u TBRICHARD.      Objective     V cues necessary for correct TE performance. No significant pain increase with TE.        PROCEDURES AND MODALITIES:   Pt had to leave early, so modalities shortened.          Ice  Ice Applied: Yes  Location: LB  Rx Minutes:  (6 min)  Ice S/P Rx: Yes    Electrical Stimulation  Stimulation Type: IFC  Location/Electrode Placement/Other: LB  Rx Minutes:  (6')       EXERCISE  Exercise 1  Exercise Name 1: Bike   Equipment/Resistance 1: L3  Time: 5'  Exercise 2  Exercise Name 2: Prone lying  Sets/Reps 2: 3' Exercise 3  Exercise Name 3: Prone B LE distraction  Time 3: 3.5' Exercise 4  Exercise Name 4: R piriformis stretch  Sets/Reps 4: 2/30\" Exercise 5  Exercise Name 5: B HS stretch  Sets/Reps 5: 2/30\" Exercise 6  Exercise Name 6: LTR  Sets/Reps 6: 10x, 2\"   Exercise 7  Exercise Name 7: GS  Sets/Reps 7: 15x, 3\" hold Exercise 8  Exercise Name 8: [place]  Sets/Reps 8: 30x Exercise 9  Exercise Name 9: TA iso  Sets/Reps 9: 10x, 3\" hold                                     Therapy Exercise [id number] 39 minutes and Other Procedure CPT 6 minutes Electrical Stimulation    Total Treatment Time: 45 Minutes    Assessment/Plan   Good tolerance of today's treatment with TE increase. Pt compliant with HEP. Pt continues to benefit from PT for further progression towards goals.  Progress per Plan of Care             Tyrone Bray, PTA  Physical Therapist    "

## 2017-10-04 ENCOUNTER — TREATMENT (OUTPATIENT)
Dept: PHYSICAL THERAPY | Facility: CLINIC | Age: 42
End: 2017-10-04

## 2017-10-04 DIAGNOSIS — M54.40 ACUTE BACK PAIN WITH SCIATICA, UNSPECIFIED LATERALITY: Primary | ICD-10-CM

## 2017-10-04 PROCEDURE — 97014 ELECTRIC STIMULATION THERAPY: CPT | Performed by: PHYSICAL THERAPIST

## 2017-10-04 PROCEDURE — 97110 THERAPEUTIC EXERCISES: CPT | Performed by: PHYSICAL THERAPIST

## 2017-10-04 NOTE — PROGRESS NOTES
"Daily Treatment Note    Time In: 1447     Time Out: 1538    ICD-10-CM ICD-9-CM   1. Acute back pain with sciatica, unspecified laterality M54.40 724.3       Subjective   Pt reports min.no sx presently. She has had a easier day at work. She can ride in car longer now.   Patient reports to therapy 0/10 pain, and 60% improvement.  Attendance  3/3 visits. Recert 10/18. MD f/u ANDREW.      Objective     V cues necessary for correct TE performance. TTP at R lumbar ps. Post treatment pain mildly increased.         PROCEDURES AND MODALITIES:     Ice  Ice Applied: Yes  Location: [facility]  Rx Minutes: 10 mins  Ice S/P Rx: Yes    Electrical Stimulation  Stimulation Type: IFC  Location/Electrode Placement/Other: [facility]  Rx Minutes: 10 mins    EXERCISE  Exercise 1  Exercise Name 1: Bike   Equipment/Resistance 1: L3  Time: 5'  Exercise 2  Exercise Name 2: Prone lying  Sets/Reps 2: 3' Exercise 3  Exercise Name 3: JO  Sets/Reps 3: 3/30\" Exercise 4  Exercise Name 4: Prone B LE distraction  Sets/Reps 4: 3' Exercise 5  Exercise Name 5: LTR  Sets/Reps 5: 10x Exercise 6  Exercise Name 6: R piriformis stretch  Sets/Reps 6: 2/30\"   Exercise 7  Exercise Name 7: GS  Sets/Reps 7: 20x Exercise 8  Exercise Name 8: B lat hang stretch  Sets/Reps 8: 6x Exercise 9  Exercise Name 9: Lat pulldowns  Equipment/Resistance 9: small SB  Sets/Reps 9: 2/10x Exercise 10  Exercise Name 10: ST core anti rot iso  Sets/Reps 10: 8x ea Exercise 11  Exercise Name 11: STM with ball  Sets/Reps 11: 3'                                   Therapy Exercise [id number] 36 minutes and Other Procedure CPT 15 minutes Electrical Stimulation    Total Treatment Time: 51 Minutes    Assessment/Plan   Fair freida of increased TE intensity today. Good sub reports of % improvement.   Progress per Plan of Care. Pt will be on vacation next week.              Tyrone Bray, PTA  Physical Therapist    "

## 2017-10-16 ENCOUNTER — TREATMENT (OUTPATIENT)
Dept: PHYSICAL THERAPY | Facility: CLINIC | Age: 42
End: 2017-10-16

## 2017-10-16 DIAGNOSIS — M54.40 ACUTE BACK PAIN WITH SCIATICA, UNSPECIFIED LATERALITY: Primary | ICD-10-CM

## 2017-10-16 PROCEDURE — 97110 THERAPEUTIC EXERCISES: CPT | Performed by: PHYSICAL THERAPIST

## 2017-10-16 NOTE — PROGRESS NOTES
"Daily Treatment Note    Time In: 1445      Time Out: 1516    ICD-10-CM ICD-9-CM   1. Acute back pain with sciatica, unspecified laterality M54.40 724.3       Subjective   Pt reports doing better. No increased pain with RTW today post fall break. No problem with sitting 45'. No LE sx felt. Pt agreable with t/f to HEP.   Patient reports to therapy 0/10 pain, and  85% improvement.  Attendance  4/4 visits. Recert 10/18. MD f/u TBD.      Objective    MMT: R ankle DF 5/5, B hip flex 4+/5, B hip ext 4+/5. V cues necessary for correct TE performance. Reviewed HEP.     AROM:    Trunk ext 90%, flex WNL.            EXERCISE  Exercise 1  Exercise Name 1: Bike   Equipment/Resistance 1: L3  Time: 6'  Exercise 2  Exercise Name 2: PPU  Sets/Reps 2: 2/10x  Exercise 2 Home Program: Yes Exercise 3  Exercise Name 3: B Piriformis stretch  Sets/Reps 3: 2/30\"  Exercise 3 Home Program: Yes Exercise 4  Exercise Name 4: Bridges  Sets/Reps 4: 2 sets  Exercise 4 Home Program: Yes Exercise 5  Exercise Name 5: TA iso  Sets/Reps 5: 20x with hold  Exercise 5 Home Program: Yes                                                 Therapy Exercise [id number] 31 minutes    Total Treatment Time: 31 Minutes    Assessment/Plan   Good overall progress and sx decrease. STG's 1,2,3, 5 met.   Other. F/U PRN.             Tyrone Bray PTA  Physical Therapist    "

## 2018-03-27 DIAGNOSIS — Z12.39 BREAST CANCER SCREENING: Primary | ICD-10-CM

## 2018-04-16 ENCOUNTER — TELEPHONE (OUTPATIENT)
Dept: FAMILY MEDICINE CLINIC | Facility: CLINIC | Age: 43
End: 2018-04-16

## 2018-04-16 NOTE — TELEPHONE ENCOUNTER
----- Message from MARYLOU Degroot sent at 4/16/2018  4:24 PM CDT -----  Inform pt, repeat in 1 year

## 2018-05-10 ENCOUNTER — DOCUMENTATION (OUTPATIENT)
Dept: PHYSICAL THERAPY | Facility: CLINIC | Age: 43
End: 2018-05-10

## 2018-05-10 NOTE — PROGRESS NOTES
Discharge Summary  Discharge Summary from Physical Therapy Report      Date of eval: 9/27/17  Number of Visits: Attendance  4/4 visits.     Discharge Status of Patient: 85% improvement    Goals status: STG's 1,2,3, 5 met    Prognosis good    Comments none    Date of Discharge 5/10/17        Poli Wills, PT  Physical Therapist

## 2018-06-06 ENCOUNTER — OFFICE VISIT (OUTPATIENT)
Dept: FAMILY MEDICINE CLINIC | Facility: CLINIC | Age: 43
End: 2018-06-06

## 2018-06-06 VITALS
HEART RATE: 70 BPM | BODY MASS INDEX: 25.36 KG/M2 | SYSTOLIC BLOOD PRESSURE: 120 MMHG | DIASTOLIC BLOOD PRESSURE: 70 MMHG | WEIGHT: 137.8 LBS | HEIGHT: 62 IN

## 2018-06-06 DIAGNOSIS — R31.9 HEMATURIA, UNSPECIFIED TYPE: ICD-10-CM

## 2018-06-06 DIAGNOSIS — R30.0 DYSURIA: ICD-10-CM

## 2018-06-06 DIAGNOSIS — R10.9 RIGHT FLANK PAIN: Primary | ICD-10-CM

## 2018-06-06 DIAGNOSIS — N83.9 LESION OF OVARY: ICD-10-CM

## 2018-06-06 LAB
BACTERIA UR QL AUTO: ABNORMAL /HPF
BILIRUB UR QL STRIP: NEGATIVE
CLARITY UR: CLEAR
COLOR UR: YELLOW
GLUCOSE UR STRIP-MCNC: NEGATIVE MG/DL
HGB UR QL STRIP.AUTO: ABNORMAL
HYALINE CASTS UR QL AUTO: ABNORMAL /LPF
KETONES UR QL STRIP: NEGATIVE
LEUKOCYTE ESTERASE UR QL STRIP.AUTO: NEGATIVE
MUCOUS THREADS URNS QL MICRO: ABNORMAL /HPF
NITRITE UR QL STRIP: NEGATIVE
PH UR STRIP.AUTO: 7 [PH] (ref 5.5–8)
PROT UR QL STRIP: NEGATIVE
RBC # UR: ABNORMAL /HPF
REF LAB TEST METHOD: ABNORMAL
SP GR UR STRIP: 1.02 (ref 1–1.03)
SQUAMOUS #/AREA URNS HPF: ABNORMAL /HPF
UROBILINOGEN UR QL STRIP: ABNORMAL
WBC UR QL AUTO: ABNORMAL /HPF

## 2018-06-06 PROCEDURE — 96372 THER/PROPH/DIAG INJ SC/IM: CPT | Performed by: NURSE PRACTITIONER

## 2018-06-06 PROCEDURE — 99214 OFFICE O/P EST MOD 30 MIN: CPT | Performed by: NURSE PRACTITIONER

## 2018-06-06 PROCEDURE — 81001 URINALYSIS AUTO W/SCOPE: CPT | Performed by: NURSE PRACTITIONER

## 2018-06-06 RX ORDER — KETOROLAC TROMETHAMINE 30 MG/ML
30 INJECTION, SOLUTION INTRAMUSCULAR; INTRAVENOUS ONCE
Status: COMPLETED | OUTPATIENT
Start: 2018-06-06 | End: 2018-06-06

## 2018-06-06 RX ORDER — SULFAMETHOXAZOLE AND TRIMETHOPRIM 800; 160 MG/1; MG/1
1 TABLET ORAL 2 TIMES DAILY
Qty: 14 TABLET | Refills: 0 | Status: SHIPPED | OUTPATIENT
Start: 2018-06-06 | End: 2018-06-13

## 2018-06-06 RX ORDER — TRAMADOL HYDROCHLORIDE 50 MG/1
50 TABLET ORAL EVERY 6 HOURS PRN
Qty: 60 TABLET | Refills: 0 | Status: SHIPPED | OUTPATIENT
Start: 2018-06-06 | End: 2018-08-21

## 2018-06-06 RX ADMIN — KETOROLAC TROMETHAMINE 30 MG: 30 INJECTION, SOLUTION INTRAMUSCULAR; INTRAVENOUS at 09:58

## 2018-06-06 NOTE — PROGRESS NOTES
"Subjective   Rosario Pruett is a 43 y.o. female. Patient here today with complaints of Urinary Tract Infection  pt here today with complaints of R flank pain, dysuria, symptoms started during the night , she has hx of UTI and kidney stone, was informed when she was seen in [facility] ER 3/18 that she did have a kidney stone on R, unknown size. Also told at that time that she had a \"spot\" on her 1 ovary she still has, hx hyst and 1 ovary removed. Denies fever, did have nausea, denies vomiting.     Vitals:    06/06/18 0930   BP: 120/70   Pulse: 70     Past Medical History:   Diagnosis Date   • Abnormal Pap smear of cervix    • Abnormal vaginal bleeding    • Acute sinusitis    • Acute upper respiratory infection    • Allergic rhinitis    • Anxiety state    • Breast pain    • Cough    • Depressive disorder    • Female pelvic peritoneal adhesions    • Fever    • Fibrocystic breast    • Foot pain, right    • Fracture of nasal bones, initial encounter for closed fracture    • Generalized anxiety disorder    • GERD (gastroesophageal reflux disease)    • H/O screening mammography    • Injury of bladder     Inadvertant perforation, due to surgery, 2009    • Kidney stone    • Lump of right breast    • Malaise and fatigue    • Menometrorrhagia    • Multiple joint pain    • Other vitamin B12 deficiency anemias    • Pain in joint of right wrist    • Patient currently pregnant     G 1, P 1     • Uterine fibroid    • Visit for gynecologic examination    • Yeast infection      Urinary Tract Infection    This is a new problem. The current episode started yesterday. The problem occurs intermittently. The problem has been waxing and waning. The quality of the pain is described as aching. The pain is moderate. There has been no fever. Associated symptoms include flank pain, frequency, hesitancy, nausea and urgency. Pertinent negatives include no chills, discharge, hematuria, possible pregnancy, sweats or vomiting. She has tried nothing " for the symptoms. The treatment provided no relief. Her past medical history is significant for kidney stones and recurrent UTIs.   Flank Pain   This is a new problem. The current episode started yesterday. The problem occurs constantly. The problem has been waxing and waning since onset. The quality of the pain is described as aching and shooting. The pain does not radiate. The pain is moderate. Associated symptoms include dysuria. Pertinent negatives include no bladder incontinence, bowel incontinence, chest pain, fever, tingling or weakness. She has tried NSAIDs for the symptoms. The treatment provided mild relief.        The following portions of the patient's history were reviewed and updated as appropriate: allergies, current medications, past family history, past medical history, past social history, past surgical history and problem list.    Review of Systems   Constitutional: Negative.  Negative for chills and fever.   HENT: Negative.    Eyes: Negative.    Respiratory: Negative.    Cardiovascular: Negative.  Negative for chest pain.   Gastrointestinal: Positive for nausea. Negative for bowel incontinence and vomiting.   Endocrine: Negative.    Genitourinary: Positive for dysuria, flank pain, frequency, hesitancy and urgency. Negative for bladder incontinence and hematuria.   Skin: Negative.    Allergic/Immunologic: Negative.    Neurological: Negative.  Negative for tingling and weakness.   Hematological: Negative.    Psychiatric/Behavioral: Negative.        Objective   Physical Exam   Constitutional: She is oriented to person, place, and time. She appears well-developed and well-nourished. No distress.   HENT:   Head: Normocephalic and atraumatic.   Cardiovascular: Normal rate, regular rhythm and normal heart sounds.  Exam reveals no gallop and no friction rub.    No murmur heard.  Pulmonary/Chest: Effort normal and breath sounds normal. No respiratory distress. She has no wheezes. She has no rales.    Abdominal: Soft. Bowel sounds are normal. She exhibits no distension and no mass. There is tenderness in the suprapubic area. There is no rigidity, no rebound, no guarding and no CVA tenderness. No hernia.       She is tender to palp of RUQ and suprapubic area noted, bs positive    Neurological: She is alert and oriented to person, place, and time.   Skin: Skin is warm and dry. No rash noted. She is not diaphoretic. No erythema. No pallor.   Psychiatric: She has a normal mood and affect. Her behavior is normal.   Nursing note and vitals reviewed.      Assessment/Plan   Rosario was seen today for urinary tract infection.    Diagnoses and all orders for this visit:    Right flank pain  -     XR Abdomen KUB  -     ketorolac (TORADOL) injection 30 mg; Inject 30 mg into the shoulder, thigh, or buttocks 1 (One) Time.    Dysuria  -     Urinalysis With / Culture If Indicated - Urine, Clean Catch  -     Urinalysis, Microscopic Only - Urine, Clean Catch; Future  -     Urinalysis, Microscopic Only - Urine, Clean Catch  -     XR Abdomen KUB    Lesion of ovary  -     US Pelvis Complete    Hematuria, unspecified type    Other orders  -     traMADol (ULTRAM) 50 MG tablet; Take 1 tablet by mouth Every 6 (Six) Hours As Needed for Moderate Pain .  -     sulfamethoxazole-trimethoprim (BACTRIM DS) 800-160 MG per tablet; Take 1 tablet by mouth 2 (Two) Times a Day for 7 days.    UA and cx are obtained, KUB obtained stat as well  She is informed of results  She is given toradol inj IM in office today  Advised to push clear fluids  marco is obtained and reviewed  MARCO query complete. Treatment plan to include limited course of prescribed controlled substance. Risks including addiction, benefits, and alternatives presented to patient.   She is given ultram prn pain and bactrim ds as above  If her symptoms persist or worsen she needs to RTC for recheck  She is aware and is in agreement to this plan  All questions and concerns are  addressed with understanding noted.   Patient's Body mass index is 25.2 kg/m². BMI is WNL.  Records from [facility] ER are obtained, CT results, discussed with pt, will obtain pelvic US at her convenience and inform of results. She is aware.

## 2018-08-21 ENCOUNTER — OFFICE VISIT (OUTPATIENT)
Dept: FAMILY MEDICINE CLINIC | Facility: CLINIC | Age: 43
End: 2018-08-21

## 2018-08-21 VITALS
WEIGHT: 146.6 LBS | BODY MASS INDEX: 26.98 KG/M2 | TEMPERATURE: 98 F | SYSTOLIC BLOOD PRESSURE: 122 MMHG | HEART RATE: 86 BPM | HEIGHT: 62 IN | DIASTOLIC BLOOD PRESSURE: 76 MMHG | OXYGEN SATURATION: 98 %

## 2018-08-21 DIAGNOSIS — J01.00 ACUTE NON-RECURRENT MAXILLARY SINUSITIS: Primary | ICD-10-CM

## 2018-08-21 DIAGNOSIS — R05.9 COUGH: ICD-10-CM

## 2018-08-21 PROCEDURE — 99213 OFFICE O/P EST LOW 20 MIN: CPT | Performed by: NURSE PRACTITIONER

## 2018-08-21 PROCEDURE — 96372 THER/PROPH/DIAG INJ SC/IM: CPT | Performed by: NURSE PRACTITIONER

## 2018-08-21 RX ORDER — CEFDINIR 300 MG/1
300 CAPSULE ORAL 2 TIMES DAILY
Qty: 20 CAPSULE | Refills: 0 | Status: SHIPPED | OUTPATIENT
Start: 2018-08-21 | End: 2018-11-07

## 2018-08-21 RX ORDER — BENZONATATE 100 MG/1
100 CAPSULE ORAL 3 TIMES DAILY PRN
Qty: 30 CAPSULE | Refills: 0 | Status: SHIPPED | OUTPATIENT
Start: 2018-08-21 | End: 2018-11-07

## 2018-08-21 RX ORDER — TRIAMCINOLONE ACETONIDE 40 MG/ML
80 INJECTION, SUSPENSION INTRA-ARTICULAR; INTRAMUSCULAR ONCE
Status: COMPLETED | OUTPATIENT
Start: 2018-08-21 | End: 2018-08-21

## 2018-08-21 RX ADMIN — TRIAMCINOLONE ACETONIDE 80 MG: 40 INJECTION, SUSPENSION INTRA-ARTICULAR; INTRAMUSCULAR at 15:04

## 2018-08-21 NOTE — PROGRESS NOTES
Subjective   Rosario Pruett is a 43 y.o. female. Patient here today with complaints of Sinusitis  pt here today with complaints of cough , nasal congestion producing yellowish drg. Has been using mucinex , symptoms started on Friday, worsening. Denies fever. Has noticed mild wheezing with cough worsening today.     Vitals:    08/21/18 1443   BP: 122/76   Pulse: 86   Temp: 98 °F (36.7 °C)   SpO2: 98%     Past Medical History:   Diagnosis Date   • Abnormal Pap smear of cervix    • Abnormal vaginal bleeding    • Acute sinusitis    • Acute upper respiratory infection    • Allergic rhinitis    • Anxiety state    • Breast pain    • Cough    • Depressive disorder    • Female pelvic peritoneal adhesions    • Fever    • Fibrocystic breast    • Foot pain, right    • Fracture of nasal bones, initial encounter for closed fracture    • Generalized anxiety disorder    • GERD (gastroesophageal reflux disease)    • H/O screening mammography    • Injury of bladder     Inadvertant perforation, due to surgery, 2009    • Kidney stone    • Lump of right breast    • Malaise and fatigue    • Menometrorrhagia    • Multiple joint pain    • Other vitamin B12 deficiency anemias    • Pain in joint of right wrist    • Patient currently pregnant     G 1, P 1     • Uterine fibroid    • Visit for gynecologic examination    • Yeast infection      Sinusitis   This is a new problem. The current episode started in the past 7 days. The problem has been gradually worsening since onset. There has been no fever. The pain is mild. Associated symptoms include chills, congestion, coughing, headaches, a hoarse voice, sinus pressure, a sore throat and swollen glands. Pertinent negatives include no diaphoresis, ear pain, neck pain, shortness of breath or sneezing. Treatments tried: mucinex. The treatment provided no relief.   Cough   This is a new problem. The current episode started in the past 7 days. The problem has been gradually worsening. The  problem occurs every few minutes. The cough is productive of sputum and productive of purulent sputum. Associated symptoms include chills, headaches, nasal congestion, postnasal drip, a sore throat and wheezing. Pertinent negatives include no chest pain, ear congestion, ear pain, fever, hemoptysis, rash, rhinorrhea, shortness of breath, sweats or weight loss. Nothing aggravates the symptoms. She has tried rest (mucinex) for the symptoms. The treatment provided no relief.        The following portions of the patient's history were reviewed and updated as appropriate: allergies, current medications, past family history, past medical history, past social history, past surgical history and problem list.    Review of Systems   Constitutional: Positive for chills. Negative for diaphoresis, fever and weight loss.   HENT: Positive for congestion, hoarse voice, postnasal drip, sinus pressure and sore throat. Negative for ear pain, rhinorrhea and sneezing.    Eyes: Negative.    Respiratory: Positive for cough and wheezing. Negative for hemoptysis and shortness of breath.    Cardiovascular: Negative.  Negative for chest pain.   Gastrointestinal: Negative.    Endocrine: Negative.    Genitourinary: Negative.    Musculoskeletal: Negative.  Negative for neck pain.   Skin: Negative.  Negative for rash.   Allergic/Immunologic: Negative.    Neurological: Positive for headaches.   Hematological: Negative.    Psychiatric/Behavioral: Negative.        Objective   Physical Exam   Constitutional: She is oriented to person, place, and time. She appears well-developed and well-nourished. No distress.   HENT:   Head: Normocephalic and atraumatic.   Right Ear: Hearing, external ear and ear canal normal. Tympanic membrane is bulging.   Left Ear: Hearing, external ear and ear canal normal. Tympanic membrane is bulging.   Nose: Right sinus exhibits maxillary sinus tenderness. Right sinus exhibits no frontal sinus tenderness. Left sinus exhibits  maxillary sinus tenderness. Left sinus exhibits no frontal sinus tenderness.   Mouth/Throat: Uvula is midline and mucous membranes are normal. Posterior oropharyngeal erythema (with erythema and cobblestoning appearance noted) present. No tonsillar exudate.   Neck: Neck supple.   Cardiovascular: Normal rate, regular rhythm and normal heart sounds.  Exam reveals no gallop and no friction rub.    No murmur heard.  Pulmonary/Chest: Effort normal and breath sounds normal. No respiratory distress. She has no wheezes. She has no rales.   Lymphadenopathy:     She has no cervical adenopathy.   Neurological: She is alert and oriented to person, place, and time.   Skin: Skin is warm and dry. No rash noted. She is not diaphoretic. No erythema. No pallor.   Psychiatric: She has a normal mood and affect. Her behavior is normal. Judgment and thought content normal.   Nursing note and vitals reviewed.      Assessment/Plan   Rosario was seen today for sinusitis.    Diagnoses and all orders for this visit:    Acute non-recurrent maxillary sinusitis  -     triamcinolone acetonide (KENALOG-40) injection 80 mg; Inject 2 mL into the appropriate muscle as directed by prescriber 1 (One) Time.    Cough    Other orders  -     cefdinir (OMNICEF) 300 MG capsule; Take 1 capsule by mouth 2 (Two) Times a Day.  -     benzonatate (TESSALON PERLES) 100 MG capsule; Take 1 capsule by mouth 3 (Three) Times a Day As Needed for Cough.    Given Omnicef and Tessalon Perls when necessary cough as above, Kenalog-40 injection 80 mg IM in office today given   If her symptoms should persist or worsen she will return to clinic for follow-up.    She is aware and is in agreement to this plan.    All questions and concerns are addressed with understanding noted.

## 2018-11-07 ENCOUNTER — OFFICE VISIT (OUTPATIENT)
Dept: FAMILY MEDICINE CLINIC | Facility: CLINIC | Age: 43
End: 2018-11-07

## 2018-11-07 VITALS
SYSTOLIC BLOOD PRESSURE: 142 MMHG | WEIGHT: 141 LBS | OXYGEN SATURATION: 98 % | HEART RATE: 64 BPM | HEIGHT: 62 IN | TEMPERATURE: 97.5 F | DIASTOLIC BLOOD PRESSURE: 80 MMHG | BODY MASS INDEX: 25.95 KG/M2

## 2018-11-07 DIAGNOSIS — M54.50 ACUTE MIDLINE LOW BACK PAIN WITHOUT SCIATICA: Primary | ICD-10-CM

## 2018-11-07 DIAGNOSIS — T14.8XXA MUSCLE STRAIN: ICD-10-CM

## 2018-11-07 PROCEDURE — 96372 THER/PROPH/DIAG INJ SC/IM: CPT | Performed by: NURSE PRACTITIONER

## 2018-11-07 PROCEDURE — 99214 OFFICE O/P EST MOD 30 MIN: CPT | Performed by: NURSE PRACTITIONER

## 2018-11-07 RX ORDER — TRIAMCINOLONE ACETONIDE 40 MG/ML
40 INJECTION, SUSPENSION INTRA-ARTICULAR; INTRAMUSCULAR ONCE
Status: COMPLETED | OUTPATIENT
Start: 2018-11-07 | End: 2018-11-07

## 2018-11-07 RX ORDER — TRAMADOL HYDROCHLORIDE 50 MG/1
50 TABLET ORAL EVERY 6 HOURS PRN
Qty: 30 TABLET | Refills: 0 | Status: SHIPPED | OUTPATIENT
Start: 2018-11-07 | End: 2020-01-02

## 2018-11-07 RX ORDER — KETOROLAC TROMETHAMINE 30 MG/ML
30 INJECTION, SOLUTION INTRAMUSCULAR; INTRAVENOUS EVERY 6 HOURS PRN
Status: DISCONTINUED | OUTPATIENT
Start: 2018-11-07 | End: 2018-11-07

## 2018-11-07 RX ORDER — CYCLOBENZAPRINE HCL 10 MG
10 TABLET ORAL 3 TIMES DAILY PRN
Qty: 30 TABLET | Refills: 0 | Status: SHIPPED | OUTPATIENT
Start: 2018-11-07 | End: 2020-01-02

## 2018-11-07 RX ORDER — KETOROLAC TROMETHAMINE 30 MG/ML
30 INJECTION, SOLUTION INTRAMUSCULAR; INTRAVENOUS ONCE
Status: COMPLETED | OUTPATIENT
Start: 2018-11-07 | End: 2018-11-07

## 2018-11-07 RX ADMIN — KETOROLAC TROMETHAMINE 30 MG: 30 INJECTION, SOLUTION INTRAMUSCULAR; INTRAVENOUS at 11:03

## 2018-11-07 RX ADMIN — TRIAMCINOLONE ACETONIDE 40 MG: 40 INJECTION, SUSPENSION INTRA-ARTICULAR; INTRAMUSCULAR at 10:56

## 2018-11-07 NOTE — PROGRESS NOTES
Subjective   Rosario Pruett is a 43 y.o. female. Patient here today with complaints of No chief complaint on file.  pt here today with complaints of lower back pain, started last night, states she was leaf blowing at her house yesterday. Denies radiculopathy, denies changes in bowel/bladder. Had old RX of ultram which she took and did help minimally.     Vitals:    11/07/18 1020   BP: 142/80   Pulse: 64   Temp: 97.5 °F (36.4 °C)   SpO2: 98%     Past Medical History:   Diagnosis Date   • Abnormal Pap smear of cervix    • Abnormal vaginal bleeding    • Acute sinusitis    • Acute upper respiratory infection    • Allergic rhinitis    • Anxiety state    • Breast pain    • Cough    • Depressive disorder    • Female pelvic peritoneal adhesions    • Fever    • Fibrocystic breast    • Foot pain, right    • Fracture of nasal bones, initial encounter for closed fracture    • Generalized anxiety disorder    • GERD (gastroesophageal reflux disease)    • H/O screening mammography    • Injury of bladder     Inadvertant perforation, due to surgery, 2009    • Kidney stone    • Lump of right breast    • Malaise and fatigue    • Menometrorrhagia    • Multiple joint pain    • Other vitamin B12 deficiency anemias    • Pain in joint of right wrist    • Patient currently pregnant     G 1, P 1     • Uterine fibroid    • Visit for gynecologic examination    • Yeast infection      Back Pain   This is a new problem. The current episode started yesterday. The problem occurs constantly. The problem is unchanged. The pain is present in the lumbar spine and gluteal. The quality of the pain is described as aching. The pain does not radiate. The pain is at a severity of 6/10. The pain is moderate. The pain is the same all the time. The symptoms are aggravated by bending and position. Pertinent negatives include no abdominal pain, bladder incontinence, bowel incontinence, chest pain, dysuria, fever, headaches, leg pain, numbness, paresis,  paresthesias, pelvic pain, perianal numbness, tingling, weakness or weight loss. She has tried analgesics, home exercises and bed rest for the symptoms. The treatment provided mild relief.        The following portions of the patient's history were reviewed and updated as appropriate: allergies, current medications, past family history, past medical history, past social history, past surgical history and problem list.    Review of Systems   Constitutional: Negative.  Negative for fever and weight loss.   HENT: Negative.    Eyes: Negative.    Respiratory: Negative.    Cardiovascular: Negative.  Negative for chest pain.   Gastrointestinal: Negative.  Negative for abdominal pain and bowel incontinence.   Endocrine: Negative.    Genitourinary: Negative.  Negative for bladder incontinence, dysuria and pelvic pain.   Musculoskeletal: Positive for back pain.   Skin: Negative.    Allergic/Immunologic: Negative.    Neurological: Negative.  Negative for tingling, weakness, numbness, headaches and paresthesias.   Hematological: Negative.    Psychiatric/Behavioral: Negative.        Objective   Physical Exam   Constitutional: She is oriented to person, place, and time. She appears well-developed and well-nourished. No distress.   HENT:   Head: Normocephalic and atraumatic.   Cardiovascular: Normal rate, regular rhythm and normal heart sounds.  Exam reveals no gallop and no friction rub.    No murmur heard.  Pulmonary/Chest: Effort normal and breath sounds normal. No respiratory distress. She has no wheezes. She has no rales.   Musculoskeletal: She exhibits tenderness.        Lumbar back: She exhibits decreased range of motion and tenderness. She exhibits no bony tenderness, no swelling, no edema, no deformity, no laceration, no pain, no spasm and normal pulse.        Back:    She is ambulatory without assist, gait guarded, patellar and ankle reflexes intact bilat    Neurological: She is alert and oriented to person, place, and  time. She displays normal reflexes. No cranial nerve deficit or sensory deficit. She exhibits normal muscle tone. Coordination normal.   Skin: Skin is warm and dry. No rash noted. She is not diaphoretic. No erythema. No pallor.   Psychiatric: She has a normal mood and affect. Her behavior is normal.   Nursing note and vitals reviewed.      Assessment/Plan   Diagnoses and all orders for this visit:    Acute midline low back pain without sciatica  -     triamcinolone acetonide (KENALOG-40) injection 40 mg; Inject 1 mL into the appropriate muscle as directed by prescriber 1 (One) Time.  -     Discontinue: ketorolac (TORADOL) injection 30 mg; Infuse 1 mL into a venous catheter Every 6 (Six) Hours As Needed for Moderate Pain .  -     ketorolac (TORADOL) injection 30 mg; Inject 1 mL into the appropriate muscle as directed by prescriber 1 (One) Time.    Muscle strain    Other orders  -     traMADol (ULTRAM) 50 MG tablet; Take 1 tablet by mouth Every 6 (Six) Hours As Needed for Moderate Pain .  -     cyclobenzaprine (FLEXERIL) 10 MG tablet; Take 1 tablet by mouth 3 (Three) Times a Day As Needed for Muscle Spasms.    Surinder obtained and reviewed, she is given Kenalog and Toradol injections as above, advised to take Tylenol with Ultram when necessary pain as above as well as Flexeril and she is advised on potential side effects of medication including sedation, informed no working or driving on muscle relaxer.  She is aware and is in agreement to this plan.  Also advised heat/ice to area and if symptoms should persist or worsen needs to return here for follow-up.  All questions and concerns are addressed with understanding noted. SURINDER query complete. Treatment plan to include limited course of prescribed controlled substance. Risks including addiction, benefits, and alternatives presented to patient.

## 2018-12-16 ENCOUNTER — PATIENT MESSAGE (OUTPATIENT)
Dept: FAMILY MEDICINE CLINIC | Facility: CLINIC | Age: 43
End: 2018-12-16

## 2018-12-17 NOTE — TELEPHONE ENCOUNTER
From: Rosario Pruett  To: Sandra Narvaez APRN  Sent: 12/16/2018 1:48 PM CST  Subject: Non-Urgent Medical Question    I was seen last week while you where out of the office and diagnosed with pneumonia. I have developed thrush from the antibiotics. Could you call me in something for it or would you have to see me in the office?

## 2019-01-23 ENCOUNTER — OFFICE VISIT (OUTPATIENT)
Dept: FAMILY MEDICINE CLINIC | Facility: CLINIC | Age: 44
End: 2019-01-23

## 2019-01-23 VITALS
TEMPERATURE: 98.2 F | HEIGHT: 62 IN | HEART RATE: 68 BPM | BODY MASS INDEX: 25.25 KG/M2 | DIASTOLIC BLOOD PRESSURE: 74 MMHG | SYSTOLIC BLOOD PRESSURE: 122 MMHG | WEIGHT: 137.2 LBS

## 2019-01-23 DIAGNOSIS — K13.0 CYST OF LIP: Primary | ICD-10-CM

## 2019-01-23 PROCEDURE — 99212 OFFICE O/P EST SF 10 MIN: CPT | Performed by: NURSE PRACTITIONER

## 2019-01-23 NOTE — PROGRESS NOTES
Subjective   Rosario Pruett is a 43 y.o. female. Patient here today with complaints of Cyst (lip)  pt here today with complaints of cyst on R lower lip which she noticed last week, sore to touch but denies bleeding, itching or area being bothersome in any other way.     Vitals:    01/23/19 1436   BP: 122/74   Pulse: 68   Temp: 98.2 °F (36.8 °C)     Past Medical History:   Diagnosis Date   • Abnormal Pap smear of cervix    • Abnormal vaginal bleeding    • Acute sinusitis    • Acute upper respiratory infection    • Allergic rhinitis    • Anxiety state    • Breast pain    • Cough    • Depressive disorder    • Female pelvic peritoneal adhesions    • Fever    • Fibrocystic breast    • Foot pain, right    • Fracture of nasal bones, initial encounter for closed fracture    • Generalized anxiety disorder    • GERD (gastroesophageal reflux disease)    • H/O screening mammography    • Injury of bladder     Inadvertant perforation, due to surgery, 2009    • Kidney stone    • Lump of right breast    • Malaise and fatigue    • Menometrorrhagia    • Multiple joint pain    • Other vitamin B12 deficiency anemias    • Pain in joint of right wrist    • Patient currently pregnant     G 1, P 1     • Uterine fibroid    • Visit for gynecologic examination    • Yeast infection      Cyst   This is a new problem. The current episode started in the past 7 days. The problem occurs constantly. The problem has been unchanged. Nothing aggravates the symptoms. She has tried nothing for the symptoms. The treatment provided no relief.        The following portions of the patient's history were reviewed and updated as appropriate: allergies, current medications, past family history, past medical history, past social history, past surgical history and problem list.    Review of Systems   Constitutional: Negative.    HENT: Negative.    Eyes: Negative.    Respiratory: Negative.    Cardiovascular: Negative.    Gastrointestinal: Negative.     Endocrine: Negative.    Genitourinary: Negative.    Musculoskeletal: Negative.    Skin: Negative.    Allergic/Immunologic: Negative.    Neurological: Negative.    Hematological: Negative.    Psychiatric/Behavioral: Negative.        Objective   Physical Exam   Constitutional: She is oriented to person, place, and time. She appears well-developed and well-nourished.   Cardiovascular: Normal rate.   Pulmonary/Chest: Effort normal.   Neurological: She is alert and oriented to person, place, and time.   Skin: Skin is warm and dry.   Palpable cyst on R lower lip, tender to palp, pea-sized cyst and is palpable from inner/outer lip   Nursing note and vitals reviewed.      Assessment/Plan   Rosario was seen today for cyst.    Diagnoses and all orders for this visit:    Cyst of lip    advised to watch, wait and if area does not resolve on its own in the next 4-6 week asked to call back for referral for removal surgically  Pt aware and is in agreement to this plan  All questions and concerns are addressed with understanding noted.

## 2019-03-07 ENCOUNTER — OFFICE VISIT (OUTPATIENT)
Dept: FAMILY MEDICINE CLINIC | Facility: CLINIC | Age: 44
End: 2019-03-07

## 2019-03-07 VITALS
HEART RATE: 74 BPM | DIASTOLIC BLOOD PRESSURE: 78 MMHG | BODY MASS INDEX: 25.25 KG/M2 | WEIGHT: 137.2 LBS | TEMPERATURE: 97.4 F | SYSTOLIC BLOOD PRESSURE: 120 MMHG | OXYGEN SATURATION: 99 % | HEIGHT: 62 IN

## 2019-03-07 DIAGNOSIS — Z00.00 ROUTINE MEDICAL EXAM: Primary | ICD-10-CM

## 2019-03-07 DIAGNOSIS — Z12.39 BREAST SCREENING: ICD-10-CM

## 2019-03-07 DIAGNOSIS — E53.8 B12 DEFICIENCY: ICD-10-CM

## 2019-03-07 PROCEDURE — 99396 PREV VISIT EST AGE 40-64: CPT | Performed by: NURSE PRACTITIONER

## 2019-03-07 NOTE — PROGRESS NOTES
Subjective   Rosario Pruett is a 43 y.o. female. Patient here today with complaints of check up (needs blood work ordered , mammo after 3/30/2019, pap smear due, hysterectomy)  pt here today for annual exam, needing labs, due for mammo, hx of b12 def, hx hyst with L ovary removed in 2017. Had biometric screening at work recently and was informed glucose was 82, LDL 98, total chol 192, hdl 73, trig 105.    Vitals:    03/07/19 1421   BP: 120/78   Pulse: 74   Temp: 97.4 °F (36.3 °C)   SpO2: 99%     Past Medical History:   Diagnosis Date   • Abnormal Pap smear of cervix    • Abnormal vaginal bleeding    • Acute sinusitis    • Acute upper respiratory infection    • Allergic rhinitis    • Anxiety state    • Breast pain    • Cough    • Depressive disorder    • Female pelvic peritoneal adhesions    • Fever    • Fibrocystic breast    • Foot pain, right    • Fracture of nasal bones, initial encounter for closed fracture    • Generalized anxiety disorder    • GERD (gastroesophageal reflux disease)    • H/O screening mammography    • Injury of bladder     Inadvertant perforation, due to surgery, 2009    • Kidney stone    • Lump of right breast    • Malaise and fatigue    • Menometrorrhagia    • Multiple joint pain    • Other vitamin B12 deficiency anemias    • Pain in joint of right wrist    • Patient currently pregnant     G 1, P 1     • Uterine fibroid    • Visit for gynecologic examination    • Yeast infection      History of Present Illness     The following portions of the patient's history were reviewed and updated as appropriate: allergies, current medications, past family history, past medical history, past social history, past surgical history and problem list.    Review of Systems   Constitutional: Negative.    HENT: Negative.    Eyes: Negative.    Respiratory: Negative.    Cardiovascular: Negative.    Gastrointestinal: Negative.    Endocrine: Negative.    Genitourinary: Negative.    Musculoskeletal: Negative.     Skin: Negative.    Allergic/Immunologic: Negative.    Neurological: Negative.    Hematological: Negative.    Psychiatric/Behavioral: Negative.        Objective   Physical Exam   Constitutional: She is oriented to person, place, and time. Vital signs are normal. She appears well-developed and well-nourished. No distress.   HENT:   Head: Normocephalic and atraumatic.   Right Ear: External ear normal.   Left Ear: External ear normal.   Nose: Nose normal.   Mouth/Throat: Oropharynx is clear and moist. No oropharyngeal exudate.   Eyes: Conjunctivae and EOM are normal. Pupils are equal, round, and reactive to light. Right eye exhibits no discharge. Left eye exhibits no discharge. No scleral icterus.   Neck: Normal range of motion. Neck supple. Normal carotid pulses present. Carotid bruit is not present.   Cardiovascular: Normal rate, regular rhythm, normal heart sounds and intact distal pulses. Exam reveals no gallop and no friction rub.   No murmur heard.  Pulmonary/Chest: Effort normal and breath sounds normal. No stridor. No respiratory distress. She has no wheezes. She has no rales. She exhibits no tenderness.   Abdominal: Soft. Bowel sounds are normal. She exhibits no distension and no mass. There is no tenderness. There is no rebound and no guarding. No hernia.   Musculoskeletal: Normal range of motion. She exhibits no edema, tenderness or deformity.   Neurological: She is alert and oriented to person, place, and time. She has normal reflexes. She displays normal reflexes. No cranial nerve deficit. She exhibits normal muscle tone. Coordination normal.   Skin: Skin is warm and dry. No rash noted. She is not diaphoretic. No erythema. No pallor.   Psychiatric: She has a normal mood and affect. Her behavior is normal. Judgment and thought content normal.   Nursing note and vitals reviewed.      Assessment/Plan   Rosario was seen today for check up.    Diagnoses and all orders for this visit:    Routine medical  exam  -     CBC & Differential; Future  -     Comprehensive metabolic panel; Future  -     Mammo Screening Bilateral With CAD; Future  -     Lipid panel; Future  -     TSH; Future  -     Vitamin B12; Future    Breast screening  -     Mammo Screening Bilateral With CAD; Future    B12 deficiency  -     Vitamin B12; Future    Physical completed, screening labs will be obtained when fasting, mammogram ordered for screening purposes as well.  She will follow-up in 1 year for annual exam or as needed otherwise, as scheduled for chronic conditions.  She is aware and is in agreement to this plan.  Biometric screening results are scanned in chart.  All questions and concerns are addressed with understanding noted.

## 2019-03-12 ENCOUNTER — LAB (OUTPATIENT)
Dept: LAB | Facility: OTHER | Age: 44
End: 2019-03-12

## 2019-03-12 DIAGNOSIS — E53.8 B12 DEFICIENCY: ICD-10-CM

## 2019-03-12 DIAGNOSIS — Z00.00 ROUTINE MEDICAL EXAM: ICD-10-CM

## 2019-03-12 LAB
ALBUMIN SERPL-MCNC: 4 G/DL (ref 3.5–5)
ALBUMIN/GLOB SERPL: 1.4 G/DL (ref 1.1–1.8)
ALP SERPL-CCNC: 78 U/L (ref 38–126)
ALT SERPL W P-5'-P-CCNC: 14 U/L
ANION GAP SERPL CALCULATED.3IONS-SCNC: 7 MMOL/L (ref 5–15)
AST SERPL-CCNC: 17 U/L (ref 14–36)
BASOPHILS # BLD AUTO: 0.01 10*3/MM3 (ref 0–0.2)
BASOPHILS NFR BLD AUTO: 0.2 % (ref 0–2)
BILIRUB SERPL-MCNC: 0.9 MG/DL (ref 0.2–1.3)
BUN BLD-MCNC: 13 MG/DL (ref 7–17)
BUN/CREAT SERPL: 17.1 (ref 7–25)
CALCIUM SPEC-SCNC: 8.9 MG/DL (ref 8.4–10.2)
CHLORIDE SERPL-SCNC: 107 MMOL/L (ref 98–107)
CHOLEST SERPL-MCNC: 185 MG/DL (ref 150–200)
CO2 SERPL-SCNC: 26 MMOL/L (ref 22–30)
CREAT BLD-MCNC: 0.76 MG/DL (ref 0.52–1.04)
DEPRECATED RDW RBC AUTO: 43 FL (ref 36.4–46.3)
EOSINOPHIL # BLD AUTO: 0.1 10*3/MM3 (ref 0–0.7)
EOSINOPHIL NFR BLD AUTO: 1.6 % (ref 0–7)
ERYTHROCYTE [DISTWIDTH] IN BLOOD BY AUTOMATED COUNT: 12.6 % (ref 11.5–14.5)
GFR SERPL CREATININE-BSD FRML MDRD: 84 ML/MIN/1.73 (ref 58–135)
GLOBULIN UR ELPH-MCNC: 2.8 GM/DL (ref 2.3–3.5)
GLUCOSE BLD-MCNC: 92 MG/DL (ref 74–99)
HCT VFR BLD AUTO: 38.9 % (ref 35–45)
HDLC SERPL-MCNC: 74 MG/DL (ref 40–59)
HGB BLD-MCNC: 12.5 G/DL (ref 12–15.5)
LDLC SERPL CALC-MCNC: 95 MG/DL
LDLC/HDLC SERPL: 1.28 {RATIO} (ref 0–3.22)
LYMPHOCYTES # BLD AUTO: 3.07 10*3/MM3 (ref 0.6–4.2)
LYMPHOCYTES NFR BLD AUTO: 48 % (ref 10–50)
MCH RBC QN AUTO: 30.9 PG (ref 26.5–34)
MCHC RBC AUTO-ENTMCNC: 32.1 G/DL (ref 31.4–36)
MCV RBC AUTO: 96 FL (ref 80–98)
MONOCYTES # BLD AUTO: 0.44 10*3/MM3 (ref 0–0.9)
MONOCYTES NFR BLD AUTO: 6.9 % (ref 0–12)
NEUTROPHILS # BLD AUTO: 2.78 10*3/MM3 (ref 2–8.6)
NEUTROPHILS NFR BLD AUTO: 43.3 % (ref 37–80)
PLATELET # BLD AUTO: 192 10*3/MM3 (ref 150–450)
PMV BLD AUTO: 10.9 FL (ref 8–12)
POTASSIUM BLD-SCNC: 3.9 MMOL/L (ref 3.4–5)
PROT SERPL-MCNC: 6.8 G/DL (ref 6.3–8.2)
RBC # BLD AUTO: 4.05 10*6/MM3 (ref 3.77–5.16)
SODIUM BLD-SCNC: 140 MMOL/L (ref 137–145)
TRIGL SERPL-MCNC: 80 MG/DL
TSH SERPL DL<=0.05 MIU/L-ACNC: 1.17 MIU/ML (ref 0.46–4.68)
VIT B12 BLD-MCNC: 371 PG/ML (ref 239–931)
VLDLC SERPL-MCNC: 16 MG/DL
WBC NRBC COR # BLD: 6.4 10*3/MM3 (ref 3.2–9.8)

## 2019-03-12 PROCEDURE — 84443 ASSAY THYROID STIM HORMONE: CPT | Performed by: NURSE PRACTITIONER

## 2019-03-12 PROCEDURE — 80061 LIPID PANEL: CPT | Performed by: NURSE PRACTITIONER

## 2019-03-12 PROCEDURE — 36415 COLL VENOUS BLD VENIPUNCTURE: CPT | Performed by: NURSE PRACTITIONER

## 2019-03-12 PROCEDURE — 80053 COMPREHEN METABOLIC PANEL: CPT | Performed by: NURSE PRACTITIONER

## 2019-03-12 PROCEDURE — 85025 COMPLETE CBC W/AUTO DIFF WBC: CPT | Performed by: NURSE PRACTITIONER

## 2019-03-12 PROCEDURE — 82607 VITAMIN B-12: CPT | Performed by: NURSE PRACTITIONER

## 2019-03-13 ENCOUNTER — CLINICAL SUPPORT (OUTPATIENT)
Dept: FAMILY MEDICINE CLINIC | Facility: CLINIC | Age: 44
End: 2019-03-13

## 2019-03-13 DIAGNOSIS — E53.8 LOW VITAMIN B12 LEVEL: Primary | ICD-10-CM

## 2019-03-13 PROCEDURE — 96372 THER/PROPH/DIAG INJ SC/IM: CPT | Performed by: NURSE PRACTITIONER

## 2019-03-13 RX ORDER — CYANOCOBALAMIN 1000 UG/ML
1000 INJECTION, SOLUTION INTRAMUSCULAR; SUBCUTANEOUS
Status: DISCONTINUED | OUTPATIENT
Start: 2019-03-13 | End: 2020-10-26 | Stop reason: SDDI

## 2019-03-13 RX ADMIN — CYANOCOBALAMIN 1000 MCG: 1000 INJECTION, SOLUTION INTRAMUSCULAR; SUBCUTANEOUS at 15:10

## 2019-03-20 ENCOUNTER — CLINICAL SUPPORT (OUTPATIENT)
Dept: FAMILY MEDICINE CLINIC | Facility: CLINIC | Age: 44
End: 2019-03-20

## 2019-03-20 DIAGNOSIS — E53.8 LOW VITAMIN B12 LEVEL: ICD-10-CM

## 2019-03-20 PROCEDURE — 96372 THER/PROPH/DIAG INJ SC/IM: CPT | Performed by: NURSE PRACTITIONER

## 2019-03-20 RX ADMIN — CYANOCOBALAMIN 1000 MCG: 1000 INJECTION, SOLUTION INTRAMUSCULAR; SUBCUTANEOUS at 15:55

## 2019-03-27 ENCOUNTER — CLINICAL SUPPORT (OUTPATIENT)
Dept: FAMILY MEDICINE CLINIC | Facility: CLINIC | Age: 44
End: 2019-03-27

## 2019-03-27 DIAGNOSIS — E53.8 B12 DEFICIENCY: ICD-10-CM

## 2019-03-27 PROCEDURE — 96372 THER/PROPH/DIAG INJ SC/IM: CPT | Performed by: NURSE PRACTITIONER

## 2019-03-27 RX ADMIN — CYANOCOBALAMIN 1000 MCG: 1000 INJECTION, SOLUTION INTRAMUSCULAR; SUBCUTANEOUS at 14:33

## 2019-04-02 ENCOUNTER — CLINICAL SUPPORT (OUTPATIENT)
Dept: OBSTETRICS AND GYNECOLOGY | Facility: CLINIC | Age: 44
End: 2019-04-02

## 2019-04-02 DIAGNOSIS — E53.8 LOW VITAMIN B12 LEVEL: ICD-10-CM

## 2019-04-02 PROCEDURE — 96372 THER/PROPH/DIAG INJ SC/IM: CPT | Performed by: NURSE PRACTITIONER

## 2019-04-02 RX ADMIN — CYANOCOBALAMIN 1000 MCG: 1000 INJECTION, SOLUTION INTRAMUSCULAR; SUBCUTANEOUS at 11:29

## 2019-04-11 ENCOUNTER — OFFICE VISIT (OUTPATIENT)
Dept: FAMILY MEDICINE CLINIC | Facility: CLINIC | Age: 44
End: 2019-04-11

## 2019-04-11 ENCOUNTER — LAB (OUTPATIENT)
Dept: LAB | Facility: OTHER | Age: 44
End: 2019-04-11

## 2019-04-11 VITALS
HEART RATE: 61 BPM | TEMPERATURE: 97.8 F | HEIGHT: 62 IN | WEIGHT: 135 LBS | DIASTOLIC BLOOD PRESSURE: 72 MMHG | SYSTOLIC BLOOD PRESSURE: 114 MMHG | BODY MASS INDEX: 24.84 KG/M2

## 2019-04-11 DIAGNOSIS — J02.9 SORE THROAT: ICD-10-CM

## 2019-04-11 DIAGNOSIS — J02.9 SORE THROAT: Primary | ICD-10-CM

## 2019-04-11 DIAGNOSIS — J02.9 ACUTE PHARYNGITIS, UNSPECIFIED ETIOLOGY: ICD-10-CM

## 2019-04-11 LAB
ALBUMIN SERPL-MCNC: 4.1 G/DL (ref 3.5–5)
ALBUMIN/GLOB SERPL: 1.3 G/DL (ref 1.1–1.8)
ALP SERPL-CCNC: 90 U/L (ref 38–126)
ALT SERPL W P-5'-P-CCNC: 15 U/L
ANION GAP SERPL CALCULATED.3IONS-SCNC: 6 MMOL/L (ref 5–15)
AST SERPL-CCNC: 19 U/L (ref 14–36)
BASOPHILS # BLD AUTO: 0.02 10*3/MM3 (ref 0–0.2)
BASOPHILS NFR BLD AUTO: 0.3 % (ref 0–1.5)
BILIRUB SERPL-MCNC: 0.5 MG/DL (ref 0.2–1.3)
BUN BLD-MCNC: 12 MG/DL (ref 7–23)
BUN/CREAT SERPL: 15.2 (ref 7–25)
CALCIUM SPEC-SCNC: 9 MG/DL (ref 8.4–10.2)
CHLORIDE SERPL-SCNC: 104 MMOL/L (ref 101–112)
CO2 SERPL-SCNC: 29 MMOL/L (ref 22–30)
CREAT BLD-MCNC: 0.79 MG/DL (ref 0.52–1.04)
DEPRECATED RDW RBC AUTO: 42.9 FL (ref 37–54)
EOSINOPHIL # BLD AUTO: 0.1 10*3/MM3 (ref 0–0.4)
EOSINOPHIL NFR BLD AUTO: 1.6 % (ref 0.3–6.2)
ERYTHROCYTE [DISTWIDTH] IN BLOOD BY AUTOMATED COUNT: 12.6 % (ref 12.3–15.4)
GFR SERPL CREATININE-BSD FRML MDRD: 80 ML/MIN/1.73 (ref 58–135)
GLOBULIN UR ELPH-MCNC: 3.1 GM/DL (ref 2.3–3.5)
GLUCOSE BLD-MCNC: 99 MG/DL (ref 70–99)
HCT VFR BLD AUTO: 41.3 % (ref 34–46.6)
HETEROPH AB SER QL LA: NEGATIVE
HGB BLD-MCNC: 13.3 G/DL (ref 12–15.9)
LYMPHOCYTES # BLD AUTO: 2.85 10*3/MM3 (ref 0.7–3.1)
LYMPHOCYTES NFR BLD AUTO: 45.5 % (ref 19.6–45.3)
MCH RBC QN AUTO: 31.1 PG (ref 26.6–33)
MCHC RBC AUTO-ENTMCNC: 32.2 G/DL (ref 31.5–35.7)
MCV RBC AUTO: 96.5 FL (ref 79–97)
MONOCYTES # BLD AUTO: 0.46 10*3/MM3 (ref 0.1–0.9)
MONOCYTES NFR BLD AUTO: 7.3 % (ref 5–12)
NEUTROPHILS # BLD AUTO: 2.83 10*3/MM3 (ref 1.4–7)
NEUTROPHILS NFR BLD AUTO: 45.3 % (ref 42.7–76)
PLATELET # BLD AUTO: 223 10*3/MM3 (ref 140–450)
PMV BLD AUTO: 10.8 FL (ref 6–12)
POTASSIUM BLD-SCNC: 4 MMOL/L (ref 3.4–5)
PROT SERPL-MCNC: 7.2 G/DL (ref 6.3–8.6)
RBC # BLD AUTO: 4.28 10*6/MM3 (ref 3.77–5.28)
S PYO AG THROAT QL: NEGATIVE
SODIUM BLD-SCNC: 139 MMOL/L (ref 137–145)
WBC NRBC COR # BLD: 6.26 10*3/MM3 (ref 3.4–10.8)

## 2019-04-11 PROCEDURE — 80053 COMPREHEN METABOLIC PANEL: CPT | Performed by: NURSE PRACTITIONER

## 2019-04-11 PROCEDURE — 36415 COLL VENOUS BLD VENIPUNCTURE: CPT | Performed by: NURSE PRACTITIONER

## 2019-04-11 PROCEDURE — 86308 HETEROPHILE ANTIBODY SCREEN: CPT | Performed by: NURSE PRACTITIONER

## 2019-04-11 PROCEDURE — 99213 OFFICE O/P EST LOW 20 MIN: CPT | Performed by: NURSE PRACTITIONER

## 2019-04-11 PROCEDURE — 85025 COMPLETE CBC W/AUTO DIFF WBC: CPT | Performed by: NURSE PRACTITIONER

## 2019-04-11 PROCEDURE — 87880 STREP A ASSAY W/OPTIC: CPT | Performed by: NURSE PRACTITIONER

## 2019-04-11 RX ORDER — AZITHROMYCIN 250 MG/1
TABLET, FILM COATED ORAL
Qty: 6 TABLET | Refills: 0 | Status: SHIPPED | OUTPATIENT
Start: 2019-04-11 | End: 2019-08-29

## 2019-04-13 LAB — BACTERIA SPEC AEROBE CULT: NORMAL

## 2019-04-16 ENCOUNTER — TELEPHONE (OUTPATIENT)
Dept: FAMILY MEDICINE CLINIC | Facility: CLINIC | Age: 44
End: 2019-04-16

## 2019-04-16 NOTE — PROGRESS NOTES
"Subjective   Rosario Pruett is a 44 y.o. female. Patient here today with complaints of Sore Throat (not any better from urgent care)  Patient here today with complaints of sore throat, was seen in urgent care with similar complaints on 4/5/2019 and treated with Magic mouthwash, at that point she was diagnosed with thrush but her symptoms persist.  Reports throat feels \"raw \"did take an Allegra but this has not helped either.  Denies fever.    Vitals:    04/11/19 1403   BP: 114/72   Pulse: 61   Temp: 97.8 °F (36.6 °C)     Past Medical History:   Diagnosis Date   • Abnormal Pap smear of cervix    • Abnormal vaginal bleeding    • Acute sinusitis    • Acute upper respiratory infection    • Allergic rhinitis    • Anxiety state    • Breast pain    • Cough    • Depressive disorder    • Female pelvic peritoneal adhesions    • Fever    • Fibrocystic breast    • Foot pain, right    • Fracture of nasal bones, initial encounter for closed fracture    • Generalized anxiety disorder    • GERD (gastroesophageal reflux disease)    • H/O screening mammography    • Injury of bladder     Inadvertant perforation, due to surgery, 2009    • Kidney stone    • Lump of right breast    • Malaise and fatigue    • Menometrorrhagia    • Multiple joint pain    • Other vitamin B12 deficiency anemias    • Pain in joint of right wrist    • Patient currently pregnant     G 1, P 1     • Uterine fibroid    • Visit for gynecologic examination    • Yeast infection      Sore Throat    This is a new problem. The current episode started 1 to 4 weeks ago. The problem has been unchanged. Neither side of throat is experiencing more pain than the other. There has been no fever. The pain is moderate. Associated symptoms include a hoarse voice and trouble swallowing. She has tried cool liquids and gargles (magic mouthwash ) for the symptoms. The treatment provided no relief.        The following portions of the patient's history were reviewed and updated " as appropriate: allergies, current medications, past family history, past medical history, past social history, past surgical history and problem list.    Review of Systems   Constitutional: Negative.    HENT: Positive for hoarse voice, sore throat and trouble swallowing.    Eyes: Negative.    Respiratory: Negative.    Cardiovascular: Negative.    Gastrointestinal: Negative.    Endocrine: Negative.    Genitourinary: Negative.    Musculoskeletal: Negative.    Skin: Negative.    Allergic/Immunologic: Negative.    Neurological: Negative.    Hematological: Negative.    Psychiatric/Behavioral: Negative.        Objective   Physical Exam   Constitutional: She is oriented to person, place, and time. She appears well-developed and well-nourished. No distress.   HENT:   Head: Normocephalic and atraumatic.   Right Ear: Hearing, tympanic membrane, external ear and ear canal normal.   Left Ear: Hearing, tympanic membrane, external ear and ear canal normal.   Nose: Right sinus exhibits no maxillary sinus tenderness and no frontal sinus tenderness. Left sinus exhibits no maxillary sinus tenderness and no frontal sinus tenderness.   Mouth/Throat: Uvula is midline and mucous membranes are normal. Posterior oropharyngeal erythema present. No tonsillar exudate.   Neck: Neck supple.   Cardiovascular: Normal rate, regular rhythm and normal heart sounds. Exam reveals no gallop and no friction rub.   No murmur heard.  Pulmonary/Chest: Effort normal and breath sounds normal. No stridor. No respiratory distress. She has no wheezes. She has no rales.   Lymphadenopathy:     She has cervical adenopathy.   Neurological: She is alert and oriented to person, place, and time.   Skin: Skin is warm and dry. No rash noted. She is not diaphoretic. No erythema. No pallor.   Psychiatric: She has a normal mood and affect. Her behavior is normal.   Nursing note and vitals reviewed.      Assessment/Plan   Rosario was seen today for sore  throat.    Diagnoses and all orders for this visit:    Sore throat  -     Mononucleosis Screen  -     CBC & Differential; Future  -     Comprehensive metabolic panel; Future  -     Rapid Strep A Screen - Swab, Throat  -     Beta Strep Culture, Throat - Swab, Throat; Future  -     Beta Strep Culture, Throat - Swab, Throat    Acute pharyngitis, unspecified etiology    Other orders  -     azithromycin (ZITHROMAX Z-JENNIE) 250 MG tablet; Take 2 tablets the first day, then 1 tablet daily for 4 days.    advised can continue with magic mouthwash prn , allegra prn   Labs as above are obtained, will inform via phone of results  Advised to continue with gargles with warm salt water, pushing fluids  Is given zithromax pk as above  If symptoms persist/worsen she is to RTC for recheck  She is aware and is in agreement to this plan  Urgent care records are reviewed today   All questions and concerns are addressed with understanding noted.

## 2019-05-01 ENCOUNTER — CLINICAL SUPPORT (OUTPATIENT)
Dept: FAMILY MEDICINE CLINIC | Facility: CLINIC | Age: 44
End: 2019-05-01

## 2019-05-01 DIAGNOSIS — E53.8 LOW VITAMIN B12 LEVEL: ICD-10-CM

## 2019-05-01 PROCEDURE — 96372 THER/PROPH/DIAG INJ SC/IM: CPT | Performed by: NURSE PRACTITIONER

## 2019-05-01 RX ADMIN — CYANOCOBALAMIN 1000 MCG: 1000 INJECTION, SOLUTION INTRAMUSCULAR; SUBCUTANEOUS at 17:12

## 2019-06-03 ENCOUNTER — CLINICAL SUPPORT (OUTPATIENT)
Dept: FAMILY MEDICINE CLINIC | Facility: CLINIC | Age: 44
End: 2019-06-03

## 2019-06-03 DIAGNOSIS — E53.8 LOW VITAMIN B12 LEVEL: ICD-10-CM

## 2019-06-03 PROCEDURE — 96372 THER/PROPH/DIAG INJ SC/IM: CPT | Performed by: NURSE PRACTITIONER

## 2019-06-03 RX ADMIN — CYANOCOBALAMIN 1000 MCG: 1000 INJECTION, SOLUTION INTRAMUSCULAR; SUBCUTANEOUS at 09:53

## 2019-07-02 ENCOUNTER — CLINICAL SUPPORT (OUTPATIENT)
Dept: FAMILY MEDICINE CLINIC | Facility: CLINIC | Age: 44
End: 2019-07-02

## 2019-07-02 DIAGNOSIS — E53.8 LOW VITAMIN B12 LEVEL: ICD-10-CM

## 2019-07-02 PROCEDURE — 96372 THER/PROPH/DIAG INJ SC/IM: CPT | Performed by: NURSE PRACTITIONER

## 2019-07-02 RX ADMIN — CYANOCOBALAMIN 1000 MCG: 1000 INJECTION, SOLUTION INTRAMUSCULAR; SUBCUTANEOUS at 13:15

## 2019-08-29 ENCOUNTER — OFFICE VISIT (OUTPATIENT)
Dept: FAMILY MEDICINE CLINIC | Facility: CLINIC | Age: 44
End: 2019-08-29

## 2019-08-29 VITALS
OXYGEN SATURATION: 98 % | SYSTOLIC BLOOD PRESSURE: 118 MMHG | WEIGHT: 141.6 LBS | HEART RATE: 69 BPM | HEIGHT: 62 IN | BODY MASS INDEX: 26.06 KG/M2 | DIASTOLIC BLOOD PRESSURE: 62 MMHG | TEMPERATURE: 97.7 F

## 2019-08-29 DIAGNOSIS — J40 BRONCHITIS: Primary | ICD-10-CM

## 2019-08-29 PROCEDURE — 99213 OFFICE O/P EST LOW 20 MIN: CPT | Performed by: NURSE PRACTITIONER

## 2019-08-29 PROCEDURE — 96372 THER/PROPH/DIAG INJ SC/IM: CPT | Performed by: NURSE PRACTITIONER

## 2019-08-29 RX ORDER — BENZONATATE 100 MG/1
100 CAPSULE ORAL 3 TIMES DAILY PRN
Qty: 30 CAPSULE | Refills: 0 | Status: SHIPPED | OUTPATIENT
Start: 2019-08-29 | End: 2020-01-02

## 2019-08-29 RX ORDER — AZITHROMYCIN 250 MG/1
TABLET, FILM COATED ORAL
Qty: 6 TABLET | Refills: 0 | Status: SHIPPED | OUTPATIENT
Start: 2019-08-29 | End: 2020-01-02

## 2019-08-29 RX ORDER — TRIAMCINOLONE ACETONIDE 40 MG/ML
80 INJECTION, SUSPENSION INTRA-ARTICULAR; INTRAMUSCULAR ONCE
Status: COMPLETED | OUTPATIENT
Start: 2019-08-29 | End: 2019-08-29

## 2019-08-29 RX ORDER — ALBUTEROL SULFATE 90 UG/1
2 AEROSOL, METERED RESPIRATORY (INHALATION) EVERY 4 HOURS PRN
Qty: 1 INHALER | Refills: 0 | Status: SHIPPED | OUTPATIENT
Start: 2019-08-29 | End: 2020-01-02

## 2019-08-29 RX ADMIN — TRIAMCINOLONE ACETONIDE 80 MG: 40 INJECTION, SUSPENSION INTRA-ARTICULAR; INTRAMUSCULAR at 12:25

## 2019-08-29 NOTE — PROGRESS NOTES
Subjective   Rosario Pruett is a 44 y.o. female. Patient here today with complaints of Allergies and URI  pt here today for complaints of cough, chest and head congestion, sore throat, has had sick contacts, reports ear congestion, has been using nyquil,dayquil and sudafed, mucinex without relief of symptoms. denies nausea or fever.     Vitals:    08/29/19 1040   BP: 118/62   Pulse: 69   Temp: 97.7 °F (36.5 °C)   SpO2: 98%     Past Medical History:   Diagnosis Date   • Abnormal Pap smear of cervix    • Abnormal vaginal bleeding    • Acute sinusitis    • Acute upper respiratory infection    • Allergic rhinitis    • Anxiety state    • Breast pain    • Cough    • Depressive disorder    • Female pelvic peritoneal adhesions    • Fever    • Fibrocystic breast    • Foot pain, right    • Fracture of nasal bones, initial encounter for closed fracture    • Generalized anxiety disorder    • GERD (gastroesophageal reflux disease)    • H/O screening mammography    • Injury of bladder     Inadvertant perforation, due to surgery, 2009    • Kidney stone    • Lump of right breast    • Malaise and fatigue    • Menometrorrhagia    • Multiple joint pain    • Other vitamin B12 deficiency anemias    • Pain in joint of right wrist    • Patient currently pregnant     G 1, P 1     • Uterine fibroid    • Visit for gynecologic examination    • Yeast infection      URI    This is a new problem. The current episode started in the past 7 days. The problem has been gradually worsening. There has been no fever. Associated symptoms include congestion, coughing, ear pain, a plugged ear sensation, a sore throat and wheezing. Pertinent negatives include no nausea or rhinorrhea. She has tried antihistamine and decongestant for the symptoms. The treatment provided mild relief.   Cough   This is a new problem. The current episode started in the past 7 days. The problem has been unchanged. The problem occurs every few minutes. The cough is  productive of sputum and productive of purulent sputum. Associated symptoms include ear congestion, ear pain, nasal congestion, postnasal drip, a sore throat and wheezing. Pertinent negatives include no fever or rhinorrhea. Nothing aggravates the symptoms. She has tried rest for the symptoms. The treatment provided no relief.        The following portions of the patient's history were reviewed and updated as appropriate: allergies, current medications, past family history, past medical history, past social history, past surgical history and problem list.    Review of Systems   Constitutional: Negative.  Negative for fever.   HENT: Positive for congestion, ear pain, postnasal drip and sore throat. Negative for rhinorrhea.    Eyes: Negative.    Respiratory: Positive for cough and wheezing.    Cardiovascular: Negative.    Gastrointestinal: Negative.  Negative for nausea.   Endocrine: Negative.    Genitourinary: Negative.    Musculoskeletal: Negative.    Skin: Negative.    Allergic/Immunologic: Negative.    Neurological: Negative.    Hematological: Negative.    Psychiatric/Behavioral: Negative.        Objective   Physical Exam   Constitutional: She is oriented to person, place, and time. She appears well-developed and well-nourished. No distress.   HENT:   Head: Normocephalic and atraumatic.   Right Ear: Hearing, external ear and ear canal normal. Tympanic membrane is bulging.   Left Ear: Hearing, external ear and ear canal normal. Tympanic membrane is bulging.   Nose: Mucosal edema present.   Mouth/Throat: Uvula is midline and mucous membranes are normal. Posterior oropharyngeal erythema (mild erythema on exam ) present. No tonsillar exudate.   Cardiovascular: Normal rate, regular rhythm and normal heart sounds. Exam reveals no gallop and no friction rub.   No murmur heard.  Pulmonary/Chest: Effort normal. No stridor. No respiratory distress. She has wheezes in the right upper field and the left upper field. She has no  rales.   Pulse ox on RA 98%   Lymphadenopathy:     She has no cervical adenopathy.   Neurological: She is alert and oriented to person, place, and time.   Skin: Skin is warm and dry. No rash noted. She is not diaphoretic. No erythema. No pallor.   Psychiatric: She has a normal mood and affect. Her behavior is normal.   Nursing note and vitals reviewed.      Assessment/Plan   Rosario was seen today for allergies and uri.    Diagnoses and all orders for this visit:    Bronchitis  -     triamcinolone acetonide (KENALOG-40) injection 80 mg    Other orders  -     albuterol sulfate  (90 Base) MCG/ACT inhaler; Inhale 2 puffs Every 4 (Four) Hours As Needed for Wheezing.  -     azithromycin (ZITHROMAX Z-JENNIE) 250 MG tablet; Take 2 tablets the first day, then 1 tablet daily for 4 days.  -     benzonatate (TESSALON PERLES) 100 MG capsule; Take 1 capsule by mouth 3 (Three) Times a Day As Needed for Cough.    work excuse given to her for today and tomorrow  Is given kenalog inj IM In office today, freida well  She is given zithromax pk and albuterol inhaler with instruction on how to use   If symptoms persist or worsen she is to RTC for recheck  She is aware and is in agreement to this plan  Tessalon perles given to use prn cough  All questions and concerns are addressed with understanding noted.

## 2019-09-04 ENCOUNTER — CLINICAL SUPPORT (OUTPATIENT)
Dept: FAMILY MEDICINE CLINIC | Facility: CLINIC | Age: 44
End: 2019-09-04

## 2019-09-04 DIAGNOSIS — E53.8 LOW VITAMIN B12 LEVEL: ICD-10-CM

## 2019-09-04 PROCEDURE — 96372 THER/PROPH/DIAG INJ SC/IM: CPT | Performed by: NURSE PRACTITIONER

## 2019-09-04 RX ADMIN — CYANOCOBALAMIN 1000 MCG: 1000 INJECTION, SOLUTION INTRAMUSCULAR; SUBCUTANEOUS at 14:47

## 2019-10-02 ENCOUNTER — CLINICAL SUPPORT (OUTPATIENT)
Dept: FAMILY MEDICINE CLINIC | Facility: CLINIC | Age: 44
End: 2019-10-02

## 2019-10-02 PROCEDURE — 96372 THER/PROPH/DIAG INJ SC/IM: CPT | Performed by: NURSE PRACTITIONER

## 2019-10-02 RX ADMIN — CYANOCOBALAMIN 1000 MCG: 1000 INJECTION, SOLUTION INTRAMUSCULAR; SUBCUTANEOUS at 14:51

## 2019-10-03 DIAGNOSIS — Z80.3 FAMILY HX-BREAST MALIGNANCY: Primary | ICD-10-CM

## 2019-10-22 ENCOUNTER — TELEPHONE (OUTPATIENT)
Dept: ONCOLOGY | Facility: CLINIC | Age: 44
End: 2019-10-22

## 2019-10-22 NOTE — TELEPHONE ENCOUNTER
Spoke with patient regarding referral received from her PCP regarding genetic testing/cousnleing. Patient states that at this time she would like to think about scheduling an appointment. I gave her my name and phone number and informed her to call at any time if she has questions or decides to proceed with scheduling an appointment.

## 2019-10-23 DIAGNOSIS — R30.0 DYSURIA: Primary | ICD-10-CM

## 2019-12-18 RX ORDER — FLUCONAZOLE 150 MG/1
150 TABLET ORAL ONCE
Qty: 1 TABLET | Refills: 0 | Status: SHIPPED | OUTPATIENT
Start: 2019-12-18 | End: 2019-12-18

## 2020-01-02 ENCOUNTER — OFFICE VISIT (OUTPATIENT)
Dept: FAMILY MEDICINE CLINIC | Facility: CLINIC | Age: 45
End: 2020-01-02

## 2020-01-02 ENCOUNTER — LAB (OUTPATIENT)
Dept: LAB | Facility: OTHER | Age: 45
End: 2020-01-02

## 2020-01-02 VITALS
DIASTOLIC BLOOD PRESSURE: 72 MMHG | WEIGHT: 139.6 LBS | SYSTOLIC BLOOD PRESSURE: 120 MMHG | TEMPERATURE: 97.7 F | BODY MASS INDEX: 25.69 KG/M2 | HEART RATE: 93 BPM | HEIGHT: 62 IN

## 2020-01-02 DIAGNOSIS — Z80.3 FAMILY HX-BREAST MALIGNANCY: ICD-10-CM

## 2020-01-02 DIAGNOSIS — R31.9 HEMATURIA, UNSPECIFIED TYPE: ICD-10-CM

## 2020-01-02 DIAGNOSIS — E53.8 B12 DEFICIENCY: ICD-10-CM

## 2020-01-02 DIAGNOSIS — Z12.31 VISIT FOR SCREENING MAMMOGRAM: ICD-10-CM

## 2020-01-02 DIAGNOSIS — N20.0 KIDNEY STONE: ICD-10-CM

## 2020-01-02 DIAGNOSIS — R35.0 FREQUENT URINATION: ICD-10-CM

## 2020-01-02 DIAGNOSIS — Z12.4 PAP SMEAR FOR CERVICAL CANCER SCREENING: ICD-10-CM

## 2020-01-02 DIAGNOSIS — M54.50 ACUTE MIDLINE LOW BACK PAIN WITHOUT SCIATICA: ICD-10-CM

## 2020-01-02 DIAGNOSIS — Z90.710 HISTORY OF HYSTERECTOMY: ICD-10-CM

## 2020-01-02 DIAGNOSIS — Z01.419 WELL WOMAN EXAM WITH ROUTINE GYNECOLOGICAL EXAM: Primary | ICD-10-CM

## 2020-01-02 DIAGNOSIS — R30.0 DYSURIA: ICD-10-CM

## 2020-01-02 LAB
ALBUMIN SERPL-MCNC: 4.3 G/DL (ref 3.5–5)
ALBUMIN/GLOB SERPL: 1.4 G/DL (ref 1.1–1.8)
ALP SERPL-CCNC: 93 U/L (ref 38–126)
ALT SERPL W P-5'-P-CCNC: 19 U/L
ANION GAP SERPL CALCULATED.3IONS-SCNC: 6 MMOL/L (ref 5–15)
AST SERPL-CCNC: 28 U/L (ref 14–36)
BACTERIA UR QL AUTO: ABNORMAL /HPF
BASOPHILS # BLD AUTO: 0.03 10*3/MM3 (ref 0–0.2)
BASOPHILS NFR BLD AUTO: 0.6 % (ref 0–1.5)
BILIRUB SERPL-MCNC: 0.3 MG/DL (ref 0.2–1.3)
BILIRUB UR QL STRIP: NEGATIVE
BUN BLD-MCNC: 13 MG/DL (ref 7–23)
BUN/CREAT SERPL: 17.3 (ref 7–25)
CALCIUM SPEC-SCNC: 9.6 MG/DL (ref 8.4–10.2)
CHLORIDE SERPL-SCNC: 105 MMOL/L (ref 101–112)
CHOLEST SERPL-MCNC: 217 MG/DL (ref 150–200)
CLARITY UR: ABNORMAL
CO2 SERPL-SCNC: 31 MMOL/L (ref 22–30)
COLOR UR: YELLOW
CREAT BLD-MCNC: 0.75 MG/DL (ref 0.52–1.04)
DEPRECATED RDW RBC AUTO: 43.2 FL (ref 37–54)
EOSINOPHIL # BLD AUTO: 0.19 10*3/MM3 (ref 0–0.4)
EOSINOPHIL NFR BLD AUTO: 3.7 % (ref 0.3–6.2)
ERYTHROCYTE [DISTWIDTH] IN BLOOD BY AUTOMATED COUNT: 12.8 % (ref 12.3–15.4)
GFR SERPL CREATININE-BSD FRML MDRD: 85 ML/MIN/1.73 (ref 58–135)
GLOBULIN UR ELPH-MCNC: 3.1 GM/DL (ref 2.3–3.5)
GLUCOSE BLD-MCNC: 86 MG/DL (ref 70–99)
GLUCOSE UR STRIP-MCNC: NEGATIVE MG/DL
HCT VFR BLD AUTO: 42.1 % (ref 34–46.6)
HDLC SERPL-MCNC: 60 MG/DL (ref 40–59)
HGB BLD-MCNC: 13.8 G/DL (ref 12–15.9)
HGB UR QL STRIP.AUTO: ABNORMAL
HYALINE CASTS UR QL AUTO: ABNORMAL /LPF
KETONES UR QL STRIP: NEGATIVE
LDLC SERPL CALC-MCNC: 131 MG/DL
LDLC/HDLC SERPL: 2.19 {RATIO} (ref 0–3.22)
LEUKOCYTE ESTERASE UR QL STRIP.AUTO: NEGATIVE
LYMPHOCYTES # BLD AUTO: 2.13 10*3/MM3 (ref 0.7–3.1)
LYMPHOCYTES NFR BLD AUTO: 41.4 % (ref 19.6–45.3)
MCH RBC QN AUTO: 30.9 PG (ref 26.6–33)
MCHC RBC AUTO-ENTMCNC: 32.8 G/DL (ref 31.5–35.7)
MCV RBC AUTO: 94.4 FL (ref 79–97)
MONOCYTES # BLD AUTO: 0.51 10*3/MM3 (ref 0.1–0.9)
MONOCYTES NFR BLD AUTO: 9.9 % (ref 5–12)
NEUTROPHILS # BLD AUTO: 2.28 10*3/MM3 (ref 1.7–7)
NEUTROPHILS NFR BLD AUTO: 44.4 % (ref 42.7–76)
NITRITE UR QL STRIP: NEGATIVE
PH UR STRIP.AUTO: 5.5 [PH] (ref 5.5–8)
PLATELET # BLD AUTO: 205 10*3/MM3 (ref 140–450)
PMV BLD AUTO: 10.6 FL (ref 6–12)
POTASSIUM BLD-SCNC: 3.8 MMOL/L (ref 3.4–5)
PROT SERPL-MCNC: 7.4 G/DL (ref 6.3–8.6)
PROT UR QL STRIP: NEGATIVE
RBC # BLD AUTO: 4.46 10*6/MM3 (ref 3.77–5.28)
RBC # UR: ABNORMAL /HPF
REF LAB TEST METHOD: ABNORMAL
SODIUM BLD-SCNC: 142 MMOL/L (ref 137–145)
SP GR UR STRIP: 1.02 (ref 1–1.03)
SQUAMOUS #/AREA URNS HPF: ABNORMAL /HPF
TRIGL SERPL-MCNC: 129 MG/DL
UROBILINOGEN UR QL STRIP: ABNORMAL
VLDLC SERPL-MCNC: 25.8 MG/DL
WBC NRBC COR # BLD: 5.14 10*3/MM3 (ref 3.4–10.8)
WBC UR QL AUTO: ABNORMAL /HPF

## 2020-01-02 PROCEDURE — 88141 CYTOPATH C/V INTERPRET: CPT | Performed by: PATHOLOGY

## 2020-01-02 PROCEDURE — 85025 COMPLETE CBC W/AUTO DIFF WBC: CPT | Performed by: NURSE PRACTITIONER

## 2020-01-02 PROCEDURE — 81001 URINALYSIS AUTO W/SCOPE: CPT | Performed by: NURSE PRACTITIONER

## 2020-01-02 PROCEDURE — 36415 COLL VENOUS BLD VENIPUNCTURE: CPT | Performed by: NURSE PRACTITIONER

## 2020-01-02 PROCEDURE — 82607 VITAMIN B-12: CPT | Performed by: NURSE PRACTITIONER

## 2020-01-02 PROCEDURE — 84443 ASSAY THYROID STIM HORMONE: CPT | Performed by: NURSE PRACTITIONER

## 2020-01-02 PROCEDURE — 88142 CYTOPATH C/V THIN LAYER: CPT | Performed by: NURSE PRACTITIONER

## 2020-01-02 PROCEDURE — 99396 PREV VISIT EST AGE 40-64: CPT | Performed by: NURSE PRACTITIONER

## 2020-01-02 PROCEDURE — 80061 LIPID PANEL: CPT | Performed by: NURSE PRACTITIONER

## 2020-01-02 PROCEDURE — 80053 COMPREHEN METABOLIC PANEL: CPT | Performed by: NURSE PRACTITIONER

## 2020-01-02 RX ORDER — TRAMADOL HYDROCHLORIDE 50 MG/1
50 TABLET ORAL EVERY 6 HOURS PRN
Qty: 30 TABLET | Refills: 0 | Status: SHIPPED | OUTPATIENT
Start: 2020-01-02 | End: 2020-10-26

## 2020-01-02 RX ORDER — CYCLOBENZAPRINE HCL 10 MG
10 TABLET ORAL 3 TIMES DAILY PRN
Qty: 30 TABLET | Refills: 0 | Status: SHIPPED | OUTPATIENT
Start: 2020-01-02 | End: 2020-10-26

## 2020-01-02 NOTE — PROGRESS NOTES
Answers for HPI/ROS submitted by the patient on 12/26/2019   How long have you been having these symptoms?: Other  Subjective   Rosario Pruett is a 44 y.o. female. Patient here today with complaints of Gynecologic Exam  Patient here today for well woman examination, requesting Pap smear, she has had hysterectomy but wants to go ahead and have Pap today since she has not had one for the last 2 years.  She reports family history of ovarian cancer, breast cancer, has had abnormal mammograms in the past however relates her last mammogram which was in April 2019 was negative.  She has had biopsies on both breast previously.  Does report several maternal aunts and maternal grandmother with breast cancer.  Also maternal aunt with ovarian cancer.  Dad has had colonic polyps but denies any colon cancer in the family.  Patient is never had colonoscopy.  She does relate that she was recently in [facility] ER with kidney stone, was having pain at that time on left side, advised that she had 2 mm kidney stone which was seen on CT reports pain currently improved however she is still having frequency of urination although no dysuria.  Has seen Dr. Ashton in the past for kidney stones.  Also complains of lower back pain mainly on right side, this is been going off and on, was on Ultram and Flexeril in the past which did help however has run out of these medications.  States that she has history of DDD and is unable to sleep at times due to pain.  No complaints of radiculopathy.    Vitals:    01/02/20 0939   BP: 120/72   Pulse: 93   Temp: 97.7 °F (36.5 °C)     Past Medical History:   Diagnosis Date   • Abnormal Pap smear of cervix    • Abnormal vaginal bleeding    • Acute sinusitis    • Acute upper respiratory infection    • Allergic rhinitis    • Anxiety state    • Breast pain    • Cough    • Depressive disorder    • Female pelvic peritoneal adhesions    • Fever    • Fibrocystic breast    • Foot pain, right    • Fracture of nasal  bones, initial encounter for closed fracture    • Generalized anxiety disorder    • GERD (gastroesophageal reflux disease)    • H/O screening mammography    • Injury of bladder     Inadvertant perforation, due to surgery,     • Kidney stone    • Lump of right breast    • Malaise and fatigue    • Menometrorrhagia    • Multiple joint pain    • Other vitamin B12 deficiency anemias    • Pain in joint of right wrist    • Patient currently pregnant     G 1, P 1     • Uterine fibroid    • Visit for gynecologic examination    • Yeast infection      Family History   Problem Relation Age of Onset   • Depression Other    • Anxiety disorder Other    • Breast cancer Maternal Aunt    • Breast cancer Maternal Aunt      Past Surgical History:   Procedure Laterality Date   • BLADDER SURGERY  2009    Bladder trauma and perforation. Closure of cystotomy.   • BREAST BIOPSY  1999    Right breast mass. Fibroadenoma. Right breast biopsy.   • BREAST BIOPSY Right 2017    Procedure: EXCISION RIGHT BREAST MASS;  Surgeon: Kvng Xavier MD;  Location: [facility];  Service:    • BREAST BIOPSY Left     negative   •  SECTION     • CYSTOPLASTY  2001    Kidney stone. Cystoscopy, retrogrades, basket extraction of stone, double J-stent placement.(2)   • ENDOMETRIAL ABLATION     • HYSTERECTOMY      has one ovary   • INJECTION OF MEDICATION  2013    B12 (4)      • INJECTION OF MEDICATION  2015    Depo Medrol (Methylprednisone) 80mg (3)      • OTHER SURGICAL HISTORY  2008    Hysteroscope procedure (1)    With thermal Choice endometrial ablation.    • TUBAL ABDOMINAL LIGATION       Social History     Socioeconomic History   • Marital status:      Spouse name: Chriss    • Number of children: 1   • Years of education: Not on file   • Highest education level: Not on file   Tobacco Use   • Smoking status: Never Smoker   • Smokeless tobacco: Never Used   Substance and Sexual Activity   • Alcohol use: No    • Drug use: No   • Sexual activity: Yes, male     Birth control/protection: Surgical, hyst with one ovary removed, unsure which one      Sexual History:       OB History    Para Term  AB Living   1 1 1  0  0 1   SAB TAB Ectopic Molar Multiple Live Births    0  0  0  0  0  1      # Outcome Date GA Lbr Keo/2nd Weight Sex Delivery Anes PTL Lv   1 Term              Menstrual History:  OB History        1    Para   1    Term   1        0    AB    0    Living   1       SAB    0    TAB    0    Ectopic    0    Molar    0    Multiple    0    Live Births    1               Menarche age: 11 years ago  LMP 9 years ago according to pt. Patient has had a hysterectomy.         Gynecologic Exam   The patient's pertinent negatives include no genital itching, genital lesions, genital odor, genital rash, pelvic pain, vaginal bleeding or vaginal discharge. She is not pregnant. Associated symptoms include back pain and frequency. She is sexually active. She uses hysterectomy for contraception. Her past medical history is significant for a  section and a gynecological surgery. There is no history of a terminated pregnancy.        The following portions of the patient's history were reviewed and updated as appropriate: allergies, current medications, past family history, past medical history, past social history, past surgical history and problem list.    Review of Systems   Constitutional: Negative.    HENT: Negative.    Eyes: Negative.    Respiratory: Negative.    Cardiovascular: Negative.    Gastrointestinal: Negative.    Endocrine: Negative.    Genitourinary: Positive for frequency. Negative for pelvic pain and vaginal discharge.   Musculoskeletal: Positive for back pain.   Skin: Negative.    Allergic/Immunologic: Negative.    Neurological: Negative.    Hematological: Negative.    Psychiatric/Behavioral: Negative.        Objective   Physical Exam   Constitutional: She is oriented to person, place,  and time. She appears well-developed and well-nourished. No distress.   HENT:   Head: Normocephalic and atraumatic.   Nose: Nose normal.   Mouth/Throat: Oropharynx is clear and moist.   Eyes: Conjunctivae and EOM are normal. Right eye exhibits no discharge. Left eye exhibits no discharge.   Neck: Normal range of motion. Neck supple. Normal carotid pulses present. Carotid bruit is not present.   Cardiovascular: Normal rate, regular rhythm and normal heart sounds. Exam reveals no gallop and no friction rub.   No murmur heard.  Pulmonary/Chest: Effort normal and breath sounds normal. No stridor. No respiratory distress. She has no wheezes. She has no rales. Right breast exhibits no inverted nipple, no mass, no nipple discharge, no skin change and no tenderness. Left breast exhibits no inverted nipple, no mass, no nipple discharge, no skin change and no tenderness. No breast swelling, tenderness, discharge or bleeding. Breasts are symmetrical.   Scars on bilateral breasts from previous biopsies as indicated on graft       Abdominal: Soft. Bowel sounds are normal. She exhibits no distension and no mass. There is tenderness (mild tenderness on exam ) in the right lower quadrant, suprapubic area and left lower quadrant. There is no guarding. Hernia confirmed negative in the right inguinal area and confirmed negative in the left inguinal area.   Genitourinary: Rectum normal. Rectal exam shows no external hemorrhoid, no internal hemorrhoid, no fissure, no mass, no tenderness, anal tone normal and guaiac negative stool. Pelvic exam was performed with patient supine. There is no rash, tenderness, lesion or injury on the right labia. There is no rash, tenderness, lesion or injury on the left labia. Right adnexum displays no mass, no tenderness and no fullness. Left adnexum displays no mass, no tenderness and no fullness. No erythema, tenderness or bleeding in the vagina. No foreign body in the vagina. No signs of injury around  the vagina. Vaginal discharge found.   Genitourinary Comments: Pap obtained from posterior vaginal wall, patient has had hysterectomy in the past, she does have minimal to moderate amount of white thick discharge present which is not bothersome to her.   Musculoskeletal: She exhibits tenderness. She exhibits no edema or deformity.        Lumbar back: She exhibits decreased range of motion and tenderness.        Back:    Lymphadenopathy:        Right: No inguinal adenopathy present.        Left: No inguinal adenopathy present.   Neurological: She is alert and oriented to person, place, and time. She has normal reflexes. She displays normal reflexes. No cranial nerve deficit. She exhibits normal muscle tone. Coordination normal.   Skin: Skin is warm and dry. No rash noted. She is not diaphoretic. No erythema. No pallor.   Psychiatric: She has a normal mood and affect. Her behavior is normal. Judgment and thought content normal.   Nursing note and vitals reviewed.      Assessment/Plan   Rosario was seen today for gynecologic exam.    Diagnoses and all orders for this visit:    Well woman exam with routine gynecological exam    Pap smear for cervical cancer screening  -     Liquid-based Pap Smear, Screening    Frequent urination  -     Urinalysis With Culture If Indicated - Urine, Clean Catch  -     Urinalysis, Microscopic Only - Urine, Clean Catch; Future  -     Urinalysis, Microscopic Only - Urine, Clean Catch  -     CBC & Differential; Future  -     Comprehensive metabolic panel; Future  -     Lipid panel; Future  -     TSH; Future  -     XR Abdomen KUB  -     Ambulatory Referral to Urology    Hematuria, unspecified type  -     CBC & Differential; Future  -     Comprehensive metabolic panel; Future  -     Lipid panel; Future  -     TSH; Future  -     Ambulatory Referral to Urology    Visit for screening mammogram  -     Cancel: Mammo Screening Bilateral With CAD; Future  -     Mammo Screening Digital Tomosynthesis  Bilateral With CAD; Future    B12 deficiency  -     Vitamin B12; Future    Family hx-breast malignancy    Acute midline low back pain without sciatica  -     CBC & Differential; Future  -     Comprehensive metabolic panel; Future  -     Lipid panel; Future  -     TSH; Future  -     traMADol (ULTRAM) 50 MG tablet; Take 1 tablet by mouth Every 6 (Six) Hours As Needed for Moderate Pain .    Kidney stone  -     traMADol (ULTRAM) 50 MG tablet; Take 1 tablet by mouth Every 6 (Six) Hours As Needed for Moderate Pain .  -     XR Abdomen KUB  -     Ambulatory Referral to Urology    History of hysterectomy  Comments:  states had one ovary removed but unsure which one, not on HRT currently     Other orders  -     cyclobenzaprine (FLEXERIL) 10 MG tablet; Take 1 tablet by mouth 3 (Three) Times a Day As Needed for Muscle Spasms.             Lab Results (most recent)     Procedure Component Value Units Date/Time    Liquid-based Pap Smear, Screening [[id number]] Collected:  01/02/20 0944    Specimen:  ThinPrep Vial from Cervix Updated:  01/02/20 1039    Urinalysis With Culture If Indicated - Urine, Clean Catch [[id number]]  (Abnormal) Collected:  01/02/20 0947    Specimen:  Urine, Clean Catch Updated:  01/02/20 1001     Color, UA Yellow     Appearance, UA Slightly Cloudy     pH, UA 5.5     Specific Gravity, UA 1.025     Glucose, UA Negative     Ketones, UA Negative     Bilirubin, UA Negative     Blood, UA Moderate (2+)     Protein, UA Negative     Leuk Esterase, UA Negative     Nitrite, UA Negative     Urobilinogen, UA 1.0 E.U./dL    Urinalysis, Microscopic Only - Urine, Clean Catch [[id number]]  (Abnormal) Collected:  01/02/20 0947    Specimen:  Urine, Clean Catch Updated:  01/02/20 1001     RBC, UA 3-5 /HPF      WBC, UA 0-2 /HPF      Bacteria, UA Trace /HPF      Squamous Epithelial Cells, UA 13-20 /HPF      Hyaline Casts, UA None Seen /LPF      Methodology Manual Light Microscopy        Lab Results (most recent)     Procedure  Component Value Units Date/Time    Liquid-based Pap Smear, Screening [[id number]] Collected:  01/02/20 0944    Specimen:  ThinPrep Vial from Cervix Updated:  01/02/20 1039    Urinalysis With Culture If Indicated - Urine, Clean Catch [[id number]]  (Abnormal) Collected:  01/02/20 0947    Specimen:  Urine, Clean Catch Updated:  01/02/20 1001     Color, UA Yellow     Appearance, UA Slightly Cloudy     pH, UA 5.5     Specific Gravity, UA 1.025     Glucose, UA Negative     Ketones, UA Negative     Bilirubin, UA Negative     Blood, UA Moderate (2+)     Protein, UA Negative     Leuk Esterase, UA Negative     Nitrite, UA Negative     Urobilinogen, UA 1.0 E.U./dL    Urinalysis, Microscopic Only - Urine, Clean Catch [[id number]]  (Abnormal) Collected:  01/02/20 0947    Specimen:  Urine, Clean Catch Updated:  01/02/20 1001     RBC, UA 3-5 /HPF      WBC, UA 0-2 /HPF      Bacteria, UA Trace /HPF      Squamous Epithelial Cells, UA 13-20 /HPF      Hyaline Casts, UA None Seen /LPF      Methodology Manual Light Microscopy        She is due for labs, these will be obtained and she will be informed of results, urinalysis is obtained today, does reveal hematuria, patient is referred back to Dr. Ashton, urology for further evaluation of kidney stone, KUB will be obtained today, CT from hospital was reviewed as well.  Pap was obtained per patient request and sent to lab for cytology.  She will be informed of results via phone.  She is given refills on Flexeril and Ultram as needed pain as above and Surinder will be obtained and reviewed.  If symptoms should persist or worsen she will return to clinic for follow-up.  She is aware and is in agreement to this plan.  All questions and concerns are addressed with understanding noted. SURINDER query complete. Treatment plan to include limited course of prescribed controlled substance. Risks including addiction, benefits, and alternatives presented to patient.

## 2020-01-03 LAB
TSH SERPL DL<=0.05 MIU/L-ACNC: 1.77 UIU/ML (ref 0.27–4.2)
VIT B12 BLD-MCNC: 571 PG/ML (ref 211–946)

## 2020-01-06 LAB
GEN CATEG CVX/VAG CYTO-IMP: NORMAL
LAB AP CASE REPORT: NORMAL
LAB AP GYN ADDITIONAL INFORMATION: NORMAL
LAB AP GYN OTHER FINDINGS: NORMAL
PATH INTERP SPEC-IMP: NORMAL
STAT OF ADQ CVX/VAG CYTO-IMP: NORMAL

## 2020-02-07 ENCOUNTER — APPOINTMENT (OUTPATIENT)
Dept: ONCOLOGY | Facility: CLINIC | Age: 45
End: 2020-02-07

## 2020-02-07 ENCOUNTER — APPOINTMENT (OUTPATIENT)
Dept: ONCOLOGY | Facility: HOSPITAL | Age: 45
End: 2020-02-07

## 2020-06-17 DIAGNOSIS — R92.8 ABNORMAL MAMMOGRAM: Primary | ICD-10-CM

## 2020-06-18 ENCOUNTER — OFFICE VISIT (OUTPATIENT)
Dept: SURGERY | Facility: CLINIC | Age: 45
End: 2020-06-18

## 2020-06-18 VITALS
WEIGHT: 145.6 LBS | TEMPERATURE: 98.9 F | HEIGHT: 62 IN | HEART RATE: 64 BPM | SYSTOLIC BLOOD PRESSURE: 116 MMHG | BODY MASS INDEX: 26.79 KG/M2 | DIASTOLIC BLOOD PRESSURE: 80 MMHG

## 2020-06-18 DIAGNOSIS — R92.8 ABNORMAL FINDING ON BREAST IMAGING: Primary | ICD-10-CM

## 2020-06-18 PROCEDURE — 99203 OFFICE O/P NEW LOW 30 MIN: CPT | Performed by: SURGERY

## 2020-06-18 NOTE — PROGRESS NOTES
Chief Complaint   Patient presents with   • Abnormal Breast Imaging     Abnormal breast and  mammogram        HPI  45-year-old woman evaluated for a possible abnormal mammogram.  She is had no newly palpable masses, skin dimpling, nipple discharge, or axillary adenopathy.  She does have history of breast cancer and 3 aunts.  Most recent imaging demonstrates the following:    Study Result        PROCEDURE: Bilateral digital screening mammogram      HISTORY: Routine screening mammography.     COMPARISON: Prior exams dated 4/11/2019 through 8/30/2012     NOTE: Computer-aided detection was utilized during this exam.   Digital breast tomosynthesis was performed.     FINDINGS:   CC and MLO views are obtained of both breasts.   Parenchymal pattern: The breasts are heterogeneously dense, which  may obscure small masses.       There is a circumscribed asymmetric density in the medial mid  left breast 6 cm from the nipple only seen on CC view. Recommend  left CC spot compression view and possibly ultrasound for further  evaluation.  Surgical clip noted in the right breast.  No  suspicious right breast mass, no architectural distortion or  suspicious microcalcifications in either breast.     IMPRESSION:  CONCLUSION:    There is a circumscribed asymmetric density in the medial mid  left breast 6 cm from the nipple only seen on CC view. Recommend  left CC spot compression view and possibly ultrasound for further  evaluation.     BI-RADS Category 0 - Incomplete: Need additional imaging  evaluation and/or prior mammograms for comparison     Electronically signed by:  Jose Maria Shelton MD  6/10/2020 8:45 AM CDT  Workstation: [id number]     Study Result     PROCEDURE: US BREAST UNILATERAL LIMITED, MAMMO BREAST DIAGNOSTIC  TOMOSYNTHESIS LEFT     HISTORY: abnormal mammo, R92.8 Other abnormal and inconclusive  findings on diagnostic imaging of breast     COMPARISON: Previous exams dated 6/9/2020 through 8/30/2012     Computer-aided detection  was utilized during this exam.   Digital breast tomosynthesis was performed.     FINDINGS:   A spot compression cc view was obtained of the left breast.  The recently described asymmetric density in the medial left  breast persists with spot compression. The margins appear well  circumscribed. This is not confidently seen on prior exams.     Ultrasound was performed of the left breast in the 7:00 axis, 4  cm from the nipple showing a well-circumscribed anechoic  structure possibly representing a cyst or intramammary lymph  node.     IMPRESSION:  CONCLUSION:    1.  Probably benign cyst or intramammary lymph node in the left  breast.   2.  Recommend six month follow-up left breast ultrasound.  3.  BI-RADS Category 3:  Probably benign findings. Initial short  interval follow-up suggested.     Electronically signed by:  Jose Maria Shelton MD  6/11/2020 11:59 AM  CDT Workstation: [id number]     ( I have personally reviewed the breast imaging and concur with the findings of the radiologist- BiRADS 3)  Past Medical History:   Diagnosis Date   • Abnormal Pap smear of cervix    • Abnormal vaginal bleeding    • Acute sinusitis    • Acute upper respiratory infection    • Allergic rhinitis    • Anxiety state    • Breast pain    • Cough    • Depressive disorder    • Female pelvic peritoneal adhesions    • Fever    • Fibrocystic breast    • Foot pain, right    • Fracture of nasal bones, initial encounter for closed fracture    • Generalized anxiety disorder    • GERD (gastroesophageal reflux disease)    • H/O screening mammography    • Injury of bladder     Inadvertant perforation, due to surgery, 2009    • Kidney stone    • Lump of right breast    • Malaise and fatigue    • Menometrorrhagia    • Multiple joint pain    • Other vitamin B12 deficiency anemias    • Pain in joint of right wrist    • Patient currently pregnant     G 1, P 1     • Uterine fibroid    • Visit for gynecologic examination    • Yeast infection        Past Surgical  History:   Procedure Laterality Date   • BLADDER SURGERY  2009    Bladder trauma and perforation. Closure of cystotomy.   • BREAST BIOPSY  1999    Right breast mass. Fibroadenoma. Right breast biopsy.   • BREAST BIOPSY Right 2017    Procedure: EXCISION RIGHT BREAST MASS;  Surgeon: Kvng Xavier MD;  Location: [facility];  Service:    • BREAST BIOPSY Left     negative   •  SECTION     • CYSTOPLASTY  2001    Kidney stone. Cystoscopy, retrogrades, basket extraction of stone, double J-stent placement.(2)   • ENDOMETRIAL ABLATION     • HYSTERECTOMY      has one ovary   • INJECTION OF MEDICATION  2013    B12 (4)      • INJECTION OF MEDICATION  2015    Depo Medrol (Methylprednisone) 80mg (3)      • OTHER SURGICAL HISTORY  2008    Hysteroscope procedure (1)    With thermal Choice endometrial ablation.    • TUBAL ABDOMINAL LIGATION           Current Outpatient Medications:   •  cyclobenzaprine (FLEXERIL) 10 MG tablet, Take 1 tablet by mouth 3 (Three) Times a Day As Needed for Muscle Spasms., Disp: 30 tablet, Rfl: 0  •  traMADol (ULTRAM) 50 MG tablet, Take 1 tablet by mouth Every 6 (Six) Hours As Needed for Moderate Pain ., Disp: 30 tablet, Rfl: 0    Current Facility-Administered Medications:   •  cyanocobalamin injection 1,000 mcg, 1,000 mcg, Intramuscular, Q28 Days, Sandra Narvaez, APRN, 1,000 mcg at 10/02/19 1451    Allergies   Allergen Reactions   • Codeine GI Intolerance   • Lortab [Hydrocodone-Acetaminophen] Itching       Family History   Problem Relation Age of Onset   • Depression Other    • Anxiety disorder Other    • Breast cancer Maternal Aunt    • Breast cancer Maternal Aunt        Social History     Socioeconomic History   • Marital status:      Spouse name: Not on file   • Number of children: Not on file   • Years of education: Not on file   • Highest education level: Not on file   Tobacco Use   • Smoking status: Never Smoker   • Smokeless tobacco: Never Used    Substance and Sexual Activity   • Alcohol use: No   • Drug use: No   • Sexual activity: Yes     Birth control/protection: Surgical       Review of Systems   Constitutional: Negative for appetite change, chills, fever and unexpected weight change.   HENT: Negative for hearing loss, nosebleeds and trouble swallowing.    Eyes: Negative for visual disturbance.   Respiratory: Negative for apnea, cough, choking, chest tightness, shortness of breath, wheezing and stridor.    Cardiovascular: Negative for chest pain, palpitations and leg swelling.   Gastrointestinal: Negative for abdominal distention, abdominal pain, blood in stool, constipation, diarrhea, nausea and vomiting.   Endocrine: Negative for cold intolerance, heat intolerance, polydipsia, polyphagia and polyuria.   Genitourinary: Negative for difficulty urinating, dysuria, frequency, hematuria and urgency.   Musculoskeletal: Negative for arthralgias, back pain, myalgias and neck pain.   Skin: Negative for color change, pallor and rash.   Allergic/Immunologic: Negative for immunocompromised state.   Neurological: Negative for dizziness, seizures, syncope, light-headedness, numbness and headaches.   Hematological: Negative for adenopathy.   Psychiatric/Behavioral: Negative for suicidal ideas. The patient is not nervous/anxious.        Physical Exam   Constitutional: She appears well-developed and well-nourished.   HENT:   Head: Normocephalic and atraumatic.   Eyes: Pupils are equal, round, and reactive to light. EOM are normal.   Neck: Normal range of motion. Neck supple.   Cardiovascular: Normal rate and regular rhythm.   Pulmonary/Chest: Effort normal and breath sounds normal. Right breast exhibits no inverted nipple, no mass, no nipple discharge, no skin change and no tenderness. Left breast exhibits no inverted nipple, no mass, no nipple discharge, no skin change and no tenderness. No breast swelling, tenderness, discharge or bleeding. Breasts are symmetrical.    Psychiatric: She has a normal mood and affect. Her behavior is normal. Judgment and thought content normal.   Vitals reviewed.        ASSESSMENT    Rosario was seen today for abnormal breast imaging.    Diagnoses and all orders for this visit:    Abnormal finding on breast imaging        PLAN    1.  I recommend a 6-month recheck with ultrasound and mammogram on the left breast.  I feel that the changes are most likely benign.              This document has been electronically signed by Kvng Xavier MD on June 18, 2020 18:20

## 2020-10-26 ENCOUNTER — OFFICE VISIT (OUTPATIENT)
Dept: FAMILY MEDICINE CLINIC | Facility: CLINIC | Age: 45
End: 2020-10-26

## 2020-10-26 VITALS
SYSTOLIC BLOOD PRESSURE: 118 MMHG | WEIGHT: 151.4 LBS | DIASTOLIC BLOOD PRESSURE: 62 MMHG | HEIGHT: 62 IN | TEMPERATURE: 97.3 F | HEART RATE: 90 BPM | BODY MASS INDEX: 27.86 KG/M2

## 2020-10-26 DIAGNOSIS — S49.92XA INJURY OF LEFT SHOULDER, INITIAL ENCOUNTER: ICD-10-CM

## 2020-10-26 DIAGNOSIS — M25.512 ACUTE PAIN OF LEFT SHOULDER: Primary | ICD-10-CM

## 2020-10-26 PROCEDURE — 99214 OFFICE O/P EST MOD 30 MIN: CPT | Performed by: NURSE PRACTITIONER

## 2020-10-26 RX ORDER — TRIAMCINOLONE ACETONIDE 40 MG/ML
60 INJECTION, SUSPENSION INTRA-ARTICULAR; INTRAMUSCULAR ONCE
Status: DISCONTINUED | OUTPATIENT
Start: 2020-10-26 | End: 2022-08-09

## 2020-10-26 RX ORDER — MELOXICAM 15 MG/1
15 TABLET ORAL DAILY
Qty: 30 TABLET | Refills: 0 | Status: SHIPPED | OUTPATIENT
Start: 2020-10-26 | End: 2022-08-09

## 2020-10-26 NOTE — PROGRESS NOTES
"Subjective   Rosario Pruett is a 45 y.o. female. Patient here today with complaints of Shoulder Pain  Pt is in office today with complaints of Left shoulder pain. Pt states the pain started about 3 weeks ago when she was lifting a heavy suitcase. Patient reports the pain is intermittent and is exacerbated with movement. She has been taking Tylenol with minimal relief. She reports the pain does not radiate down her arm, but it does radiate around her shoulder. Denies tingling, numbness.     Vitals:    10/26/20 1448   BP: 118/62   Pulse: 90   Temp: 97.3 °F (36.3 °C)   Weight: 68.7 kg (151 lb 6.4 oz)   Height: 157.5 cm (62\")   PainSc:   7   PainLoc: Shoulder     Body mass index is 27.69 kg/m².  Past Medical History:   Diagnosis Date   • Abnormal Pap smear of cervix    • Abnormal vaginal bleeding    • Acute sinusitis    • Acute upper respiratory infection    • Allergic rhinitis    • Anxiety state    • Breast pain    • Cough    • Depressive disorder    • Female pelvic peritoneal adhesions    • Fever    • Fibrocystic breast    • Foot pain, right    • Fracture of nasal bones, initial encounter for closed fracture    • Generalized anxiety disorder    • GERD (gastroesophageal reflux disease)    • H/O screening mammography    • Injury of bladder     Inadvertant perforation, due to surgery, 2009    • Kidney stone    • Lump of right breast    • Malaise and fatigue    • Menometrorrhagia    • Multiple joint pain    • Other vitamin B12 deficiency anemias    • Pain in joint of right wrist    • Patient currently pregnant     G 1, P 1     • Uterine fibroid    • Visit for gynecologic examination    • Yeast infection      Shoulder Injury   The incident occurred at home. The left shoulder is affected. The incident occurred more than 1 week ago. Injury mechanism: Lifting heavy suitcase. The quality of the pain is described as stabbing. The pain does not radiate. The pain is at a severity of 7/10. The pain is moderate. Pertinent " negatives include no chest pain, muscle weakness, numbness or tingling. The symptoms are aggravated by movement and overhead lifting. She has tried acetaminophen and rest for the symptoms. The treatment provided mild relief.        The following portions of the patient's history were reviewed and updated as appropriate: allergies, current medications, past family history, past medical history, past social history, past surgical history and problem list.    Review of Systems   Constitutional: Negative.    HENT: Negative.    Eyes: Negative.    Respiratory: Negative.    Cardiovascular: Negative.  Negative for chest pain.   Gastrointestinal: Negative.    Endocrine: Negative.    Genitourinary: Negative.    Musculoskeletal: Positive for arthralgias. Negative for back pain, gait problem, joint swelling, myalgias, neck pain and neck stiffness.   Skin: Negative.    Allergic/Immunologic: Negative.    Neurological: Negative.  Negative for tingling and numbness.   Hematological: Negative.    Psychiatric/Behavioral: Negative.        Objective   Physical Exam  Vitals signs and nursing note reviewed.   Constitutional:       General: She is not in acute distress.     Appearance: Normal appearance. She is not ill-appearing, toxic-appearing or diaphoretic.   HENT:      Head: Normocephalic and atraumatic.      Right Ear: Tympanic membrane, ear canal and external ear normal. There is no impacted cerumen.      Left Ear: Tympanic membrane, ear canal and external ear normal. There is no impacted cerumen.   Neck:      Musculoskeletal: Normal range of motion.   Cardiovascular:      Rate and Rhythm: Normal rate and regular rhythm.      Pulses: Normal pulses.      Heart sounds: Normal heart sounds. No murmur. No friction rub. No gallop.    Pulmonary:      Effort: Pulmonary effort is normal. No respiratory distress.      Breath sounds: Normal breath sounds. No stridor. No wheezing, rhonchi or rales.   Chest:      Chest wall: No tenderness.    Musculoskeletal: Normal range of motion.         General: Tenderness and signs of injury present. No swelling or deformity.      Left shoulder: She exhibits pain.      Comments: Positive empty can test on left , pain increased with resistance of abduction, reaching backward, internal/ external rotation, no painful arc, pt is R handed   Skin:     General: Skin is warm and dry.      Capillary Refill: Capillary refill takes less than 2 seconds.      Coloration: Skin is not jaundiced or pale.      Findings: No bruising, erythema, lesion or rash.   Neurological:      General: No focal deficit present.      Mental Status: She is alert and oriented to person, place, and time. Mental status is at baseline.      Cranial Nerves: No cranial nerve deficit.      Motor: No weakness.      Coordination: Coordination normal.      Gait: Gait normal.      Deep Tendon Reflexes: Reflexes normal.   Psychiatric:         Mood and Affect: Mood normal.         Behavior: Behavior normal.         Thought Content: Thought content normal.         Judgment: Judgment normal.         Assessment/Plan   Diagnoses and all orders for this visit:    1. Acute pain of left shoulder (Primary)  -     XR Shoulder 2+ View Left  -     MRI Shoulder Left Without Contrast; Future  -     triamcinolone acetonide (KENALOG-40) injection 60 mg    2. Injury of left shoulder, initial encounter  Comments:  3 weeks ago, lifted large, heavy suitcase    Other orders  -     meloxicam (Mobic) 15 MG tablet; Take 1 tablet by mouth Daily.  Dispense: 30 tablet; Refill: 0    X-ray Left shoulder today, we will phone pt with results  MRI Left shoulder ordered, they will phone with appointment  Kenalog 60mg IM today, freida well  All questions and concerns are addressed with understanding noted.  The patient is in agreement to above plan.   Consider referral to ortho as indicated depending on test results, pt aware  Advised to hold nsaids while using mobic for pain, can take tylenol  in addition if needed . Pt aware  Prescribed mobic as above

## 2020-10-30 ENCOUNTER — HOSPITAL ENCOUNTER (OUTPATIENT)
Dept: MRI IMAGING | Facility: HOSPITAL | Age: 45
Discharge: HOME | End: 2020-10-30
Admitting: NURSE PRACTITIONER

## 2020-10-30 DIAGNOSIS — M25.512 ACUTE PAIN OF LEFT SHOULDER: ICD-10-CM

## 2020-10-30 PROCEDURE — 73221 MRI JOINT UPR EXTREM W/O DYE: CPT

## 2020-11-03 DIAGNOSIS — M25.519 SHOULDER PAIN, UNSPECIFIED CHRONICITY, UNSPECIFIED LATERALITY: ICD-10-CM

## 2020-11-03 DIAGNOSIS — R93.6 ABNORMAL MRI, SHOULDER: Primary | ICD-10-CM

## 2020-11-20 ENCOUNTER — OFFICE VISIT (OUTPATIENT)
Dept: ORTHOPEDIC SURGERY | Facility: CLINIC | Age: 45
End: 2020-11-20

## 2020-11-20 VITALS — HEIGHT: 62 IN | BODY MASS INDEX: 28.34 KG/M2 | WEIGHT: 154 LBS

## 2020-11-20 DIAGNOSIS — M25.512 LEFT SHOULDER PAIN, UNSPECIFIED CHRONICITY: Primary | ICD-10-CM

## 2020-11-20 DIAGNOSIS — M75.42 IMPINGEMENT SYNDROME OF LEFT SHOULDER: ICD-10-CM

## 2020-11-20 PROCEDURE — 20610 DRAIN/INJ JOINT/BURSA W/O US: CPT | Performed by: ORTHOPAEDIC SURGERY

## 2020-11-20 PROCEDURE — 99203 OFFICE O/P NEW LOW 30 MIN: CPT | Performed by: ORTHOPAEDIC SURGERY

## 2020-11-20 NOTE — PROGRESS NOTES
Rosario Pruett is a 45 y.o. female   Primary provider:  Sandra Narvaez APRN       Chief Complaint   Patient presents with   • Left Shoulder - Pain       HISTORY OF PRESENT ILLNESS: Patient is here today for left shoulder pain. She was lifting a box approximately 6 months ago. She had MRI/xray prior to visit. She states that her pain is 4/10.  She is 45 years old who was lifting a suitcase and putting in the back of her vehicle several weeks ago and really exacerbated her left nondominant shoulder pain.  Hurts with activity better with rest.  She had a steroid injection IM and this did help her somewhat.  She is been worked up with x-rays and MRI scan and referred here for further treatment options.    Pain  This is a recurrent problem. The current episode started more than 1 month ago. The problem occurs intermittently. The problem has been unchanged. Associated symptoms include arthralgias and joint swelling. Pertinent negatives include no abdominal pain, chest pain, chills, fever, nausea or vomiting. She has tried NSAIDs and rest for the symptoms. The treatment provided no relief.        CONCURRENT MEDICAL HISTORY:    Past Medical History:   Diagnosis Date   • Abnormal Pap smear of cervix    • Abnormal vaginal bleeding    • Acute sinusitis    • Acute upper respiratory infection    • Allergic rhinitis    • Anxiety state    • Breast pain    • Cough    • Depressive disorder    • Female pelvic peritoneal adhesions    • Fever    • Fibrocystic breast    • Foot pain, right    • Fracture of nasal bones, initial encounter for closed fracture    • Generalized anxiety disorder    • GERD (gastroesophageal reflux disease)    • H/O screening mammography    • Injury of bladder     Inadvertant perforation, due to surgery, 2009    • Kidney stone    • Lump of right breast    • Malaise and fatigue    • Menometrorrhagia    • Multiple joint pain    • Other vitamin B12 deficiency anemias    • Pain in joint of right wrist    •  Patient currently pregnant     G 1, P 1     • Uterine fibroid    • Visit for gynecologic examination    • Yeast infection        Allergies   Allergen Reactions   • Codeine GI Intolerance   • Lortab [Hydrocodone-Acetaminophen] Itching         Current Outpatient Medications:   •  meloxicam (Mobic) 15 MG tablet, Take 1 tablet by mouth Daily., Disp: 30 tablet, Rfl: 0    Current Facility-Administered Medications:   •  triamcinolone acetonide (KENALOG-40) injection 60 mg, 60 mg, Intramuscular, Once, Sandra Narvaez APRN    Past Surgical History:   Procedure Laterality Date   • BLADDER SURGERY  2009    Bladder trauma and perforation. Closure of cystotomy.   • BREAST BIOPSY  1999    Right breast mass. Fibroadenoma. Right breast biopsy.   • BREAST BIOPSY Right 2017    Procedure: EXCISION RIGHT BREAST MASS;  Surgeon: Kvng Xavier MD;  Location: [facility];  Service:    • BREAST BIOPSY Left     negative   •  SECTION     • CYSTOPLASTY  2001    Kidney stone. Cystoscopy, retrogrades, basket extraction of stone, double J-stent placement.(2)   • ENDOMETRIAL ABLATION     • HYSTERECTOMY      has one ovary   • INJECTION OF MEDICATION  2013    B12 (4)      • INJECTION OF MEDICATION  2015    Depo Medrol (Methylprednisone) 80mg (3)      • OTHER SURGICAL HISTORY  2008    Hysteroscope procedure (1)    With thermal Choice endometrial ablation.    • TUBAL ABDOMINAL LIGATION         Family History   Problem Relation Age of Onset   • Depression Other    • Anxiety disorder Other    • Breast cancer Maternal Aunt    • Breast cancer Maternal Aunt         Social History     Socioeconomic History   • Marital status:      Spouse name: Not on file   • Number of children: Not on file   • Years of education: Not on file   • Highest education level: Not on file   Tobacco Use   • Smoking status: Never Smoker   • Smokeless tobacco: Never Used   Substance and Sexual Activity   • Alcohol use: No   • Drug  "use: No   • Sexual activity: Yes     Birth control/protection: Surgical        Review of Systems   Constitutional: Positive for activity change. Negative for chills and fever.   HENT: Negative.  Negative for facial swelling.    Respiratory: Negative.  Negative for apnea and shortness of breath.    Cardiovascular: Negative.  Negative for chest pain and leg swelling.   Gastrointestinal: Negative.  Negative for abdominal pain, nausea and vomiting.   Endocrine: Negative.    Genitourinary: Negative.  Negative for dysuria.   Musculoskeletal: Positive for arthralgias and joint swelling.   Skin: Negative.  Negative for color change.   Allergic/Immunologic: Negative.    Neurological: Negative.  Negative for seizures and syncope.   Hematological: Negative.  Negative for adenopathy.   Psychiatric/Behavioral: Negative.  Negative for dysphoric mood.       PHYSICAL EXAMINATION:       Ht 157.5 cm (62\")   Wt 69.9 kg (154 lb)   BMI 28.17 kg/m²     Physical Exam  Constitutional:       Appearance: She is well-developed.   HENT:      Head: Normocephalic and atraumatic.   Eyes:      Pupils: Pupils are equal, round, and reactive to light.   Neck:      Musculoskeletal: Neck supple.   Pulmonary:      Effort: Pulmonary effort is normal.   Musculoskeletal:         General: Tenderness present.   Skin:     General: Skin is warm and dry.   Neurological:      Mental Status: She is alert and oriented to person, place, and time.         GAIT:     []  Normal  []  Antalgic    Assistive device: [x]  None  []  Walker     []  Crutches  []  Cane     []  Wheelchair  []  Stretcher    Ortho Exam  Passive motion well-maintained.  Positive impingement x2 pain resistance of the supraspinatus and to lesser extent the infraspinatus.  Not tender over the AC joint.  Neurologically intact distally.  Good capillary refill.    Xr Shoulder 2+ View Left    Result Date: 10/26/2020  Narrative: Three view left shoulder HISTORY: Left shoulder pain x3 weeks. AP films " with the humerus in internal and external rotation and tangential view were obtained. COMPARISON: None FINDINGS: No fracture or dislocation. No other osseous or articular abnormality.     Impression: CONCLUSION: Normal left shoulder [id number] Electronically signed by:  Dane Cruz MD  10/26/2020 5:26 PM CDT Workstation: [id number]    Mri Shoulder Left Without Contrast    Result Date: 10/30/2020  Narrative: MRI of the left shoulder without contrast HISTORY: Pain x3 weeks. Left shoulder pain and decreased range of motion and strength since lifting injury 3-4 weeks ago. Multisequence multiplanar images of the left shoulder were obtained without contrast. COMPARISON: None. Correlation with radiographs of October 26, 2020. FINDINGS: Minimal hypertrophic change acromioclavicular joint without evidence of impingement. However, there are does appear to be some impingement upon the supraspinatus tendon by the acromion process. Supraspinatus tendinosis. Very small joint effusion with minimal fluid in the subdeltoid bursa.  No evidence of tear of the supraspinatus tendon. The subscapularis tendon is intact. The glenoid labrum appears intact. The bicipital tendon lies in the bicipital tendon groove. No osseous abnormality.     Impression: CONCLUSION: Minimal hypertrophic change acromioclavicular joint without evidence of impingement. However, there are does appear to be some impingement upon the supraspinatus tendon by the acromion process. Supraspinatus tendinosis. Very small joint effusion with minimal fluid in the subdeltoid bursa. [id number] Electronically signed by:  Dane Cruz MD  10/30/2020 6:09 PM CDT Workstation: [id number]    I reviewed the x-rays and MRI scan agree with these findings      ASSESSMENT:    Diagnoses and all orders for this visit:    Left shoulder pain, unspecified chronicity  -     Large Joint Arthrocentesis: L subacromial bursa    Impingement syndrome of left shoulder      Large Joint Arthrocentesis: L  subacromial bursa  Date/Time: 11/20/2020 3:20 PM  Consent given by: patient  Site marked: site marked  Timeout: Immediately prior to procedure a time out was called to verify the correct patient, procedure, equipment, support staff and site/side marked as required   Supporting Documentation  Indications: pain   Procedure Details  Location: shoulder - L subacromial bursa  Preparation: Patient was prepped and draped in the usual sterile fashion  Needle size: 22 G  Approach: posterior  Medications administered: 4 mL lidocaine 1 %, 2 mL triamcinolone acetonide 40 MG/ML  Patient tolerance: patient tolerated the procedure well with no immediate complications            PLAN I have gone over her MRI scan with her and gone over the pictures and showed her and explained the pathology in the shoulder.  We have elected to do a subacromial injection she has good initial relief with this.  I have also provided her with Thera-Band exercises she will start after the shoulder feels better.  She will limit her overhead activity will follow-up in a month to see how she is getting along I suspect she will get better at that point she will ice the shoulder down tonight.  Will call sooner with any problems.  Patient's Body mass index is 28.17 kg/m². BMI is within normal parameters. No follow-up required..  No follow-ups on file.        This document has been electronically signed by Dustin Mejia MD on November 20, 2020 16:11 CST

## 2020-12-14 DIAGNOSIS — R92.8 ABNORMAL MAMMOGRAM: Primary | ICD-10-CM

## 2022-08-09 ENCOUNTER — OFFICE VISIT (OUTPATIENT)
Dept: OBSTETRICS AND GYNECOLOGY | Facility: CLINIC | Age: 47
End: 2022-08-09

## 2022-08-09 ENCOUNTER — LAB (OUTPATIENT)
Dept: LAB | Facility: OTHER | Age: 47
End: 2022-08-09

## 2022-08-09 VITALS
BODY MASS INDEX: 29.26 KG/M2 | WEIGHT: 159 LBS | HEIGHT: 62 IN | DIASTOLIC BLOOD PRESSURE: 76 MMHG | HEART RATE: 80 BPM | SYSTOLIC BLOOD PRESSURE: 108 MMHG

## 2022-08-09 DIAGNOSIS — R53.83 FATIGUE, UNSPECIFIED TYPE: ICD-10-CM

## 2022-08-09 DIAGNOSIS — R23.2 HOT FLASHES: ICD-10-CM

## 2022-08-09 DIAGNOSIS — Z01.419 ENCOUNTER FOR WELL WOMAN EXAM WITH ROUTINE GYNECOLOGICAL EXAM: Primary | ICD-10-CM

## 2022-08-09 DIAGNOSIS — G47.9 DIFFICULTY SLEEPING: ICD-10-CM

## 2022-08-09 PROCEDURE — 82306 VITAMIN D 25 HYDROXY: CPT | Performed by: NURSE PRACTITIONER

## 2022-08-09 PROCEDURE — 36415 COLL VENOUS BLD VENIPUNCTURE: CPT | Performed by: NURSE PRACTITIONER

## 2022-08-09 PROCEDURE — 83001 ASSAY OF GONADOTROPIN (FSH): CPT | Performed by: NURSE PRACTITIONER

## 2022-08-09 PROCEDURE — 99396 PREV VISIT EST AGE 40-64: CPT | Performed by: NURSE PRACTITIONER

## 2022-08-09 PROCEDURE — 82670 ASSAY OF TOTAL ESTRADIOL: CPT | Performed by: NURSE PRACTITIONER

## 2022-08-09 PROCEDURE — 82607 VITAMIN B-12: CPT | Performed by: NURSE PRACTITIONER

## 2022-08-09 PROCEDURE — 84439 ASSAY OF FREE THYROXINE: CPT | Performed by: NURSE PRACTITIONER

## 2022-08-09 PROCEDURE — 84443 ASSAY THYROID STIM HORMONE: CPT | Performed by: NURSE PRACTITIONER

## 2022-08-09 NOTE — PROGRESS NOTES
Subjective   Chief Complaint   Patient presents with   • Gynecologic Exam     WWREGINO Pruett is a 47 y.o. year old  presenting to be seen for her annual exam.  Pt complains of hot flashes, night sweats, difficulty sleeping and weight gain over the last 5-6 weeks. S/P hysterectomy w/left SO in . Pt has an aunt that had breast and ovarian cancer and another aunt with breast cancer. She had previously opted to avoid hormones but admits that the lack of sleep is affecting her ADLs. She has noted hair thinning, decreased motivation and fatigue. Denies vaginal dryness.    She is sexually active.  In the past 12 months there has not been new sexual partners.  Condoms are not typically used.  She would not like to be screened for STD's at today's exam.     She exercises regularly: yes.  She wears her seat belt:yes.  She has concerns about domestic violence: no.  She is taking Vit D and Calcium:no  Last colonoscopy: Never  Last DEXA: Never  Last MM/15/22  Last PAP: 2020  Hx of abnormal pap: yes, had cryofreeze        LMP: , had failed endometrial ablation requiring hysterectomy with left SO. Previous right salpingectomy in     OB History        1    Para   1    Term   1            AB        Living   1       SAB        IAB        Ectopic        Molar        Multiple        Live Births                  The following portions of the patient's history were reviewed and updated as appropriate:problem list, current medications, allergies, past family history, past medical history, past social history and past surgical history.    Social History    Tobacco Use      Smoking status: Never Smoker      Smokeless tobacco: Never Used    Review of Systems   Constitutional: Positive for diaphoresis (night sweats), fatigue and unexpected weight change (gain).   Respiratory: Negative.    Cardiovascular: Negative.    Gastrointestinal: Negative.  Negative for abdominal pain, constipation  "(chronic, mild, self limiting) and diarrhea.   Endocrine: Positive for heat intolerance (hot flashes).   Genitourinary: Negative.  Negative for dyspareunia, dysuria, pelvic pain, vaginal discharge and vaginal pain.   Skin: Negative.         Hair thinning    Neurological: Negative for dizziness, syncope, light-headedness and headaches.   Psychiatric/Behavioral: Positive for agitation and sleep disturbance. Negative for suicidal ideas. The patient is not nervous/anxious.          Objective   /76   Pulse 80   Ht 157.5 cm (62\")   Wt 72.1 kg (159 lb)   LMP  (LMP Unknown) Comment: W left ovary removed  Breastfeeding No   BMI 29.08 kg/m²     General:  well developed; well nourished  no acute distress   Skin:  No suspicious lesions seen   Cardiac: Heart sounds are normal.  Regular rate and rhythm without murmur, gallop or rub.   Resp:  Normal expansion.  Clear to auscultation.  No rales, rhonchi, or wheezing.   Thyroid: not examined   Breasts:  Examined in supine position  Symmetric without masses or skin dimpling  Nipples normal without inversion, lesions or discharge  There are no palpable axillary nodes   Previous biopsy scar noted    Abdomen: soft, non-tender; no masses  no umbilical or inginual hernias are present  no hepato-splenomegaly   Psych: alert,oriented, in NAD with a full range of affect, normal behavior and no psychotic features   Pelvis: Clinical staff was present for exam  External genitalia:  normal appearance of the external genitalia including Bartholin's and Monette's glands.  :  urethral meatus normal; urethral hypermobility is absent.  Vaginal:  normal pink mucosa without prolapse or lesions.  Cervix:  absent.  Uterus:  absent.  Adnexa:  left ovary absent, right ovary not palpable      Lab Review   CBC, CMP and TSH from 6 months ago    Imaging  Mammogram report       Diagnoses and all orders for this visit:    1. Encounter for well woman exam with routine gynecological exam (Primary)  -    "  LIQUID-BASED PAP SMEAR, [facility] ([facility])    2. Hot flashes  -     TSH  -     T4, Free  -     Vitamin B12  -     Vitamin D 25 Hydroxy  -     Follicle Stimulating Hormone  -     Estradiol    3. Difficulty sleeping  -     TSH  -     T4, Free  -     Vitamin B12  -     Vitamin D 25 Hydroxy  -     Follicle Stimulating Hormone  -     Estradiol    4. Fatigue, unspecified type  -     TSH  -     T4, Free  -     Vitamin B12  -     Vitamin D 25 Hydroxy  -     Follicle Stimulating Hormone  -     Estradiol    Pap results: I will send card in mail or call if abnormal. RTC annually for WWE or sooner PRN.     SBE encouraged monthly. Pt completes these. MMGs annually. She is up to date.      and monogamous. Surgical contraception.     Discussed concerns for possible menopause. I recommend we obtain the labs listed above and then discuss next steps from there. Given family hx, limiting hormone exposure may be ideal, so we can further discuss non hormonal options to help alleviate some of her symptoms. I will call with results and discussed next steps. Pt agrees with this plan of care.     This note was electronically signed.    Mercedes Rogers, APRN  August 9, 2022

## 2022-08-10 LAB
25(OH)D3 SERPL-MCNC: 32.8 NG/ML (ref 30–100)
ESTRADIOL SERPL HS-MCNC: 187 PG/ML
FSH SERPL-ACNC: 30.4 MIU/ML
T4 FREE SERPL-MCNC: 1.11 NG/DL (ref 0.93–1.7)
TSH SERPL DL<=0.05 MIU/L-ACNC: 0.36 UIU/ML (ref 0.27–4.2)
VIT B12 BLD-MCNC: 846 PG/ML (ref 211–946)

## 2022-08-12 LAB — REF LAB TEST METHOD: NORMAL

## 2022-08-12 RX ORDER — PROGESTERONE 100 MG/1
100 CAPSULE ORAL DAILY
Qty: 30 CAPSULE | Refills: 1 | Status: SHIPPED | OUTPATIENT
Start: 2022-08-12 | End: 2022-09-23 | Stop reason: SDUPTHER

## 2022-08-12 NOTE — PROGRESS NOTES
Fsh 30/ Estradiol still 187. Discussed R/B/A to HRT vs SSRI for perimenopause. Pt agrees to prometrium trial. FU Sept 23 at 3:00.

## 2022-09-13 RX ORDER — PROGESTERONE 100 MG/1
100 CAPSULE ORAL DAILY
Qty: 30 CAPSULE | Refills: 1 | OUTPATIENT
Start: 2022-09-13

## 2022-09-23 ENCOUNTER — OFFICE VISIT (OUTPATIENT)
Dept: OBSTETRICS AND GYNECOLOGY | Facility: CLINIC | Age: 47
End: 2022-09-23

## 2022-09-23 VITALS
WEIGHT: 161.4 LBS | HEIGHT: 62 IN | SYSTOLIC BLOOD PRESSURE: 128 MMHG | DIASTOLIC BLOOD PRESSURE: 86 MMHG | HEART RATE: 60 BPM | BODY MASS INDEX: 29.7 KG/M2

## 2022-09-23 DIAGNOSIS — R53.83 FATIGUE, UNSPECIFIED TYPE: ICD-10-CM

## 2022-09-23 DIAGNOSIS — G47.9 DIFFICULTY SLEEPING: ICD-10-CM

## 2022-09-23 DIAGNOSIS — R23.2 HOT FLASHES: Primary | ICD-10-CM

## 2022-09-23 PROCEDURE — 99213 OFFICE O/P EST LOW 20 MIN: CPT | Performed by: NURSE PRACTITIONER

## 2022-09-23 RX ORDER — PROGESTERONE 100 MG/1
100 CAPSULE ORAL DAILY
Qty: 30 CAPSULE | Refills: 10 | Status: SHIPPED | OUTPATIENT
Start: 2022-09-23

## 2022-09-23 NOTE — PROGRESS NOTES
Subjective   Rosario Pruett is a 47 y.o. female.     History of Present Illness   Pt presents for FU on prometrium 100mg for hot flashes, night sweats, fatigue and difficulty sleeping based on perimenopausal labs. Pt states she is much better. Can maybe recall 2-3 hot flashes since starting and no night sweats. As a result, she is able to sleep better as well. If she wakes up, she is able to actually go back to sleep. She has gained 2lb since starting it. She does note a little bloating but it is tolerable. She has cut out sodas and is drinking more water. Denies headaches or any other concerns side effects.     The following portions of the patient's history were reviewed and updated as appropriate: allergies, current medications, past family history, past medical history, past social history, past surgical history and problem list.    Review of Systems   Constitutional: Negative.  Negative for chills, diaphoresis (resolved), fatigue and fever.   Respiratory: Negative.    Cardiovascular: Negative.    Gastrointestinal: Abdominal distention: mild bloating.   Endocrine: Negative for heat intolerance (resolved).   Neurological: Negative for headache.   Psychiatric/Behavioral: Negative.  Negative for sleep disturbance (improved).       Objective    Vitals:    09/23/22 1504   BP: 128/86   Pulse: 60         09/23/22  1504   Weight: 73.2 kg (161 lb 6.4 oz)     Body mass index is 29.52 kg/m².    Physical Exam  Vitals reviewed.   Constitutional:       Appearance: Normal appearance.   Cardiovascular:      Rate and Rhythm: Normal rate and regular rhythm.      Heart sounds: Normal heart sounds.   Pulmonary:      Effort: Pulmonary effort is normal.      Breath sounds: Normal breath sounds.   Neurological:      Mental Status: She is alert and oriented to person, place, and time.   Psychiatric:         Mood and Affect: Mood normal.         Behavior: Behavior normal.       Component      Latest Ref Rng & Units 8/9/2022    TSH Baseline      0.270 - 4.200 uIU/mL 0.357   Free T4      0.93 - 1.70 ng/dL 1.11   Vitamin B-12      211 - 946 pg/mL 846   25 Hydroxy, Vitamin D      30.0 - 100.0 ng/ml 32.8   FSH      mIU/mL 30.40   Estradiol      pg/mL 187.0       Assessment & Plan   Diagnoses and all orders for this visit:    1. Hot flashes (Primary)  -     Progesterone (Prometrium) 100 MG capsule; Take 1 capsule by mouth Daily.  Dispense: 30 capsule; Refill: 10    2. Difficulty sleeping  -     Progesterone (Prometrium) 100 MG capsule; Take 1 capsule by mouth Daily.  Dispense: 30 capsule; Refill: 10    3. Fatigue, unspecified type  -     Progesterone (Prometrium) 100 MG capsule; Take 1 capsule by mouth Daily.  Dispense: 30 capsule; Refill: 10    I reviewed labs with pt. She is doing well on this prometrium regimen. I recommend she continue taking daily as prescribed and that we do not increase the dose as bloating would likely worsen. Pt agrees with this plan of care. Refilled x 1  Year until WWE. MMG is up to date 7/15/22.     Encouraged continued hydration and exercise to help with bloating. Pt will let me know if it becomes problematic.     RTC for WWE or sooner PRN.

## 2023-03-29 NOTE — MR AVS SNAPSHOT
Rosario Pruett   1/16/2017 9:00 AM   Office Visit    Dept Phone:  [phone]   Encounter #:  [id number]    Provider:  MARYLOU Berg   Department:  [facility] [place]                Your Full Care Plan              Today's Medication Changes          These changes are accurate as of: 1/16/17  9:43 AM.  If you have any questions, ask your nurse or doctor.               Stop taking medication(s)listed here:     busPIRone 15 MG tablet   Commonly known as:  BUSPAR   Stopped by:  MARYLOU Berg           escitalopram 10 MG tablet   Commonly known as:  LEXAPRO   Stopped by:  MARYLOU Berg           meloxicam 7.5 MG tablet   Commonly known as:  MOBIC   Stopped by:  MARYLOU Berg           pantoprazole 40 MG EC tablet   Commonly known as:  PROTONIX   Stopped by:  MARYLOU Berg           Scopolamine 1.5 MG/3DAYS patch   Commonly known as:  TRANSDERM-SCOP (1.5 MG)   Stopped by:  MARYLOU Berg           traMADol 50 MG tablet   Commonly known as:  ULTRAM   Stopped by:  MARYLOU Berg                      Your Updated Medication List      Notice  As of 1/16/2017  9:43 AM    You have not been prescribed any medications.            You Were Diagnosed With        Codes Comments    Breast lump in upper outer quadrant    -  Primary ICD-10-CM: N63  ICD-9-CM: 611.72     Screening mammogram, encounter for     ICD-10-CM: Z12.31  ICD-9-CM: V76.12       Instructions     None    Patient Instructions History      Upcoming Appointments     Visit Type Date Time Department    OFFICE VISIT 1/16/2017  9:00 AM [facility]      Months Of Me Signup     [facility] [employer] Me allows you to send messages to your doctor, view your test results, renew your prescriptions, schedule appointments, and more. To sign up, go to [email or web address] and  "click on the Sign Up Now link in the New User? box. Enter your [employer] Activation Code exactly as it appears below along with the last four digits of your Social Security Number and your Date of Birth () to complete the sign-up process. If you do not sign up before the expiration date, you must request a new code.    Yhat Activation Code: [email or web address]  Expires: 2017  9:43 AM    If you have questions, you can email [email or web address] or call [phone] to talk to our Y[employer] staff. Remember, [employer] is NOT to be used for urgent needs. For medical emergencies, dial 911.               Other Info from Your Visit           Allergies     Codeine      Lortab [Hydrocodone-acetaminophen]        Reason for Visit     Gynecologic Exam well woman annual visit      Vital Signs     Blood Pressure Pulse Respirations Height Weight Last Menstrual Period    110/64 (BP Location: Right arm, Patient Position: Sitting, Cuff Size: Adult) 74 16 62\" (157.5 cm) 145 lb 3.2 oz (65.9 kg) (Approximate)    Breastfeeding? Body Mass Index Smoking Status             No 26.56 kg/m2 Never Smoker         Problems and Diagnoses Noted     Breast lump in upper outer quadrant    -  Primary    Encounter for screening mammogram for breast cancer            " fair minus

## 2023-05-24 ENCOUNTER — OFFICE VISIT (OUTPATIENT)
Dept: OBSTETRICS AND GYNECOLOGY | Facility: CLINIC | Age: 48
End: 2023-05-24
Payer: COMMERCIAL

## 2023-05-24 VITALS
DIASTOLIC BLOOD PRESSURE: 78 MMHG | WEIGHT: 165.2 LBS | SYSTOLIC BLOOD PRESSURE: 120 MMHG | BODY MASS INDEX: 30.4 KG/M2 | HEART RATE: 69 BPM | HEIGHT: 62 IN

## 2023-05-24 DIAGNOSIS — Z12.31 SCREENING MAMMOGRAM, ENCOUNTER FOR: ICD-10-CM

## 2023-05-24 DIAGNOSIS — G47.9 DIFFICULTY SLEEPING: ICD-10-CM

## 2023-05-24 DIAGNOSIS — R23.2 HOT FLASHES: Primary | ICD-10-CM

## 2023-05-24 DIAGNOSIS — Z71.89 COUNSELING FOR HORMONE REPLACEMENT THERAPY: ICD-10-CM

## 2023-05-24 PROCEDURE — 99213 OFFICE O/P EST LOW 20 MIN: CPT | Performed by: NURSE PRACTITIONER

## 2023-05-24 RX ORDER — PANTOPRAZOLE SODIUM 40 MG/1
40 TABLET, DELAYED RELEASE ORAL
COMMUNITY
Start: 2023-05-22

## 2023-05-24 RX ORDER — LORATADINE 10 MG/1
10 TABLET ORAL DAILY
COMMUNITY

## 2023-05-24 RX ORDER — ESTRADIOL 0.03 MG/D
1 PATCH, EXTENDED RELEASE TRANSDERMAL 2 TIMES WEEKLY
Qty: 8 EACH | Refills: 2 | Status: SHIPPED | OUTPATIENT
Start: 2023-05-25

## 2023-05-26 NOTE — PROGRESS NOTES
Subjective   Rosario Pruett is a 48 y.o. female.     History of Present Illness   Pt presents with concerns about hot flashes, night sweats, weight gain, fatigue, joint pain, and mood swings. I saw pt last year for these concerns and labs were consistent with perimenopause. We began prometrium last year and took for about 3 months. She did have noticeable improvement in her symptoms but had significant bloating and swelling. She discontinued and symptoms worsened over the last 6 months. S/P hysterectomy w/left SO in 2009. Last MMG 7/15/22.     The following portions of the patient's history were reviewed and updated as appropriate: allergies, current medications, past family history, past medical history, past social history, past surgical history and problem list.    Review of Systems   Constitutional: Positive for diaphoresis (night sweats), fatigue and unexpected weight gain. Negative for chills, fever and unexpected weight loss.   Respiratory: Negative.    Cardiovascular: Negative.    Endocrine: Positive for heat intolerance (hot flashes).   Genitourinary: Negative for dyspareunia.   Musculoskeletal: Positive for arthralgias.   Neurological: Positive for syncope. Negative for dizziness and light-headedness.   Psychiatric/Behavioral: Positive for agitation.       Objective   Physical Exam  Vitals reviewed.   Constitutional:       General: She is not in acute distress.     Appearance: Normal appearance. She is obese. She is not ill-appearing.   Cardiovascular:      Rate and Rhythm: Normal rate.   Pulmonary:      Effort: Pulmonary effort is normal.   Skin:     General: Skin is warm and dry.   Neurological:      Mental Status: She is alert and oriented to person, place, and time.   Psychiatric:         Mood and Affect: Mood normal.         Behavior: Behavior normal.           Assessment & Plan   Diagnoses and all orders for this visit:    1. Hot flashes (Primary)  -     Vivelle-Dot 0.025 MG/24HR patch; Place  1 patch on the skin as directed by provider 2 (Two) Times a Week.  Dispense: 8 each; Refill: 2    2. Difficulty sleeping  -     Vivelle-Dot 0.025 MG/24HR patch; Place 1 patch on the skin as directed by provider 2 (Two) Times a Week.  Dispense: 8 each; Refill: 2    3. Counseling for hormone replacement therapy  -     Vivelle-Dot 0.025 MG/24HR patch; Place 1 patch on the skin as directed by provider 2 (Two) Times a Week.  Dispense: 8 each; Refill: 2    4. Screening mammogram, encounter for  -     Mammo Screening Digital Tomosynthesis Bilateral With CAD; Future      Pt has an aunt that had breast and ovarian cancer and another aunt with breast cancer. She had previously opted to avoid hormones but with this significnatly affecting her ADLs, she is willing to try estrogen. We discussed that the risk of breast cancer is only slightly increased with estrogen and progesterone together.     Discussed CV risks of HRT and pt is low risk otherwise.     She agrees to estrogen replacement with patches. Begin the lowest vivelle dot dose, twice weekly patches. RTC in 8 weeks or sooner PRN. Consider Natara testing at next visit if desired.  MMG due after 7/15/23.

## 2023-08-11 ENCOUNTER — OFFICE VISIT (OUTPATIENT)
Dept: OBSTETRICS AND GYNECOLOGY | Facility: CLINIC | Age: 48
End: 2023-08-11
Payer: COMMERCIAL

## 2023-08-11 VITALS
DIASTOLIC BLOOD PRESSURE: 80 MMHG | SYSTOLIC BLOOD PRESSURE: 118 MMHG | WEIGHT: 168.6 LBS | OXYGEN SATURATION: 100 % | BODY MASS INDEX: 31.03 KG/M2 | HEIGHT: 62 IN

## 2023-08-11 DIAGNOSIS — Z01.419 ENCOUNTER FOR WELL WOMAN EXAM WITH ROUTINE GYNECOLOGICAL EXAM: Primary | ICD-10-CM

## 2023-08-11 DIAGNOSIS — B37.31 VAGINAL CANDIDA: ICD-10-CM

## 2023-08-11 DIAGNOSIS — Z79.890 HORMONE REPLACEMENT THERAPY: ICD-10-CM

## 2023-08-11 PROCEDURE — 99213 OFFICE O/P EST LOW 20 MIN: CPT | Performed by: NURSE PRACTITIONER

## 2023-08-11 PROCEDURE — 99396 PREV VISIT EST AGE 40-64: CPT | Performed by: NURSE PRACTITIONER

## 2023-08-11 RX ORDER — ESTRADIOL 0.05 MG/D
1 PATCH, EXTENDED RELEASE TRANSDERMAL WEEKLY
Status: CANCELLED | OUTPATIENT
Start: 2023-08-11

## 2023-08-11 RX ORDER — FLUCONAZOLE 150 MG/1
150 TABLET ORAL
Qty: 2 TABLET | Refills: 0 | Status: SHIPPED | OUTPATIENT
Start: 2023-08-11

## 2023-08-11 RX ORDER — ESTRADIOL 0.03 MG/D
1 FILM, EXTENDED RELEASE TRANSDERMAL 2 TIMES WEEKLY
Qty: 8 EACH | Refills: 11 | Status: SHIPPED | OUTPATIENT
Start: 2023-08-14 | End: 2023-08-14 | Stop reason: DRUGHIGH

## 2023-08-11 RX ORDER — MULTIPLE VITAMINS W/ MINERALS TAB 9MG-400MCG
1 TAB ORAL DAILY
COMMUNITY

## 2023-08-14 RX ORDER — ESTRADIOL 0.04 MG/D
1 FILM, EXTENDED RELEASE TRANSDERMAL 2 TIMES WEEKLY
Qty: 8 PATCH | Refills: 11 | Status: SHIPPED | OUTPATIENT
Start: 2023-08-14 | End: 2024-08-13

## 2023-08-14 NOTE — PROGRESS NOTES
Subjective   Chief Complaint   Patient presents with    Gynecologic Exam     Rosario Pruett is a 48 y.o. year old  presenting to be seen for her annual exam.  Pt complains of some continued hot flashes, night sweats, weight has been maintained on vivelle dot 0.025 patch. . S/P hysterectomy w/left SO in . Pt has an aunt that had breast and ovarian cancer and another aunt with breast cancer.  Denies vaginal dryness.    She is sexually active.  In the past 12 months there has not been new sexual partners.  Condoms are not typically used.  She would not like to be screened for STD's at today's exam.     She exercises regularly: yes.  She wears her seat belt:yes.  She has concerns about domestic violence: no.  She is taking Vit D and Calcium:no  Last colonoscopy: 22  Last DEXA: Never  Last MM23  Last PAP:22  Hx of abnormal pap: yes, had cryofreeze        LMP: , had failed endometrial ablation requiring hysterectomy with left SO. Previous right salpingectomy in     OB History          1    Para   1    Term   1            AB        Living   1         SAB        IAB        Ectopic        Molar        Multiple        Live Births                  The following portions of the patient's history were reviewed and updated as appropriate:problem list, current medications, allergies, past family history, past medical history, past social history and past surgical history.    Social History    Tobacco Use      Smoking status: Never      Smokeless tobacco: Never    Review of Systems   Constitutional:  Positive for diaphoresis (night sweats improving but still occasional). Negative for fatigue and unexpected weight change (maintaining).   Respiratory: Negative.     Cardiovascular: Negative.    Gastrointestinal: Negative.  Negative for abdominal pain, constipation (chronic, mild, self limiting) and diarrhea.   Endocrine: Positive for heat intolerance (hot flashes occasional).  "  Genitourinary: Negative.  Negative for dyspareunia, dysuria, pelvic pain, vaginal discharge and vaginal pain.   Skin: Negative.    Neurological:  Negative for dizziness, syncope, light-headedness and headaches.   Psychiatric/Behavioral:  Negative for agitation, sleep disturbance and suicidal ideas. The patient is not nervous/anxious.        Objective   /80   Ht 157.5 cm (62\")   Wt 76.5 kg (168 lb 9.6 oz)   LMP  (LMP Unknown) Comment: W BSO  SpO2 100%   BMI 30.84 kg/mý     General:  well developed; well nourished  no acute distress   Skin:  No suspicious lesions seen   Cardiac: Heart sounds are normal.  Regular rate and rhythm without murmur, gallop or rub.   Resp:  Normal expansion.  Clear to auscultation.  No rales, rhonchi, or wheezing.   Thyroid: not examined   Breasts:  Examined in supine position  Symmetric without masses or skin dimpling  Nipples normal without inversion, lesions or discharge  There are no palpable axillary nodes   Previous biopsy scar noted    Abdomen: soft, non-tender; no masses  no umbilical or inginual hernias are present  no hepato-splenomegaly   Psych: alert,oriented, in NAD with a full range of affect, normal behavior and no psychotic features   Pelvis: Clinical staff was present for exam  External genitalia:  normal appearance of the external genitalia including Bartholin's and [place]'s glands.  :  urethral meatus normal; urethral hypermobility is absent.  Vaginal:  normal pink mucosa without prolapse or lesions. discharge present -  white; wet prep done: clue cells are absent, pseudo-hyphae are present, trichomonads are absent, and excess WBC's are absent;  Cervix:  absent.  Uterus:  absent.  Adnexa:  left ovary absent, right ovary not palpable      Lab Review   CBC, CMP, FSH, and TSH     Imaging  Mammogram report       Diagnoses and all orders for this visit:    1. Encounter for well woman exam with routine gynecological exam (Primary)    2. Hormone replacement " therapy  -     Discontinue: estradiol (Vivelle-Dot) 0.025 MG/24HR patch; Place 1 patch on the skin as directed by provider 2 (Two) Times a Week.  Dispense: 8 each; Refill: 11  -     estradiol (Vivelle-Dot) 0.0375 MG/24HR patch; Place 1 patch on the skin as directed by provider 2 (Two) Times a Week.  Dispense: 8 patch; Refill: 11    3. Vaginal candida  -     fluconazole (DIFLUCAN) 150 MG tablet; Take 1 tablet by mouth Every 3 (Three) Days. Repeat dose in 72 hours if still symptomatic  Dispense: 2 tablet; Refill: 0    Pap results: I will send card in mail or call if abnormal. RTC annually for WWE or sooner PRN.     SBE encouraged monthly. Pt completes these. MMGs annually. She is up to date.      and monogamous. Surgical contraception.     Pt is doing some better on vivelle dot but does have room for improvement. We will increase dose of patch to the 0.0375. Denies issues with them sticking.     Discussed findings of yeast on exam. Treat with diflucan 150mg x 2 doses, 72 hours apart.     This note was electronically signed.    Mercedes Rogers, APRN  August 14, 2023

## (undated) DEVICE — SPECIMEN ORIENTATION CHARMS, SIX DISTINCTLY SHAPED STERILE 10MM CHARMS: Brand: MARGINMAP

## (undated) DEVICE — CAMERA COVER: Brand: UNBRANDED

## (undated) DEVICE — PAD GRND REM POLYHESIVE A/ DISP

## (undated) DEVICE — ANTIBACTERIAL UNDYED BRAIDED (POLYGLACTIN 910), SYNTHETIC ABSORBABLE SUTURE: Brand: COATED VICRYL

## (undated) DEVICE — DEV SPECI TRANSPEC W/PERF PLT

## (undated) DEVICE — GOWN,AURORA,NOREINF,RAGLAN,XL,STERILE: Brand: MEDLINE

## (undated) DEVICE — GLV SURG TRIUMPH PF LTX 6.5 STRL

## (undated) DEVICE — SPNG GZ WOVN 4X4IN 12PLY 10/BX STRL

## (undated) DEVICE — GLV SURG SENSICARE ALOE LF PF SZ7.5 GRN

## (undated) DEVICE — STERILE POLYISOPRENE POWDER-FREE SURGICAL GLOVES WITH EMOLLIENT COATING: Brand: PROTEXIS

## (undated) DEVICE — GLV SURG TRIUMPH LT PF LTX 7 STRL

## (undated) DEVICE — GLV SURG SENSICARE GREEN W/ALOE PF LF 7 STRL

## (undated) DEVICE — ST CVR PROB PULLUP ULTRASND 5X48IN

## (undated) DEVICE — 3M™ STERI-STRIP™ REINFORCED ADHESIVE SKIN CLOSURES, R1547, 1/2 IN X 4 IN (12 MM X 100 MM), 6 STRIPS/ENVELOPE: Brand: 3M™ STERI-STRIP™

## (undated) DEVICE — STERILE POLYISOPRENE POWDER-FREE SURGICAL GLOVES: Brand: PROTEXIS

## (undated) DEVICE — ADHS LIQ MASTISOL 2/3ML

## (undated) DEVICE — SUT VICRYL 3-0 SH-1 PO 18IN J772D

## (undated) DEVICE — GLV SURG SENSICARE GREEN W/ALOE PF LF 8 STRL

## (undated) DEVICE — SOL IRR NACL 0.9PCT BT 1000ML

## (undated) DEVICE — PK MAJ PROC LF 60

## (undated) DEVICE — GLV SURG TRIUMPH LT PF LTX 7.5 STRL

## (undated) DEVICE — CONTAINER,SPECIMEN,OR STERILE,4OZ: Brand: MEDLINE

## (undated) DEVICE — GLV SURG TRIUMPH LT PF LTX 6.5 STRL